# Patient Record
Sex: FEMALE | Race: WHITE | NOT HISPANIC OR LATINO | Employment: FULL TIME | URBAN - METROPOLITAN AREA
[De-identification: names, ages, dates, MRNs, and addresses within clinical notes are randomized per-mention and may not be internally consistent; named-entity substitution may affect disease eponyms.]

---

## 2017-06-20 ENCOUNTER — ALLSCRIPTS OFFICE VISIT (OUTPATIENT)
Dept: OTHER | Facility: OTHER | Age: 32
End: 2017-06-20

## 2017-06-26 LAB — PAP (HISTORICAL): NORMAL

## 2018-01-13 VITALS
HEIGHT: 64 IN | DIASTOLIC BLOOD PRESSURE: 82 MMHG | BODY MASS INDEX: 26.29 KG/M2 | SYSTOLIC BLOOD PRESSURE: 120 MMHG | WEIGHT: 154 LBS

## 2018-04-10 ENCOUNTER — TELEPHONE (OUTPATIENT)
Dept: OBGYN CLINIC | Facility: CLINIC | Age: 33
End: 2018-04-10

## 2018-04-10 DIAGNOSIS — Z30.41 ENCOUNTER FOR SURVEILLANCE OF CONTRACEPTIVE PILLS: Primary | ICD-10-CM

## 2018-04-10 RX ORDER — NORETHINDRONE ACETATE AND ETHINYL ESTRADIOL 1MG-20(21)
1 KIT ORAL DAILY
Qty: 90 TABLET | Refills: 0 | Status: SHIPPED | OUTPATIENT
Start: 2018-04-10 | End: 2018-06-21 | Stop reason: SDUPTHER

## 2018-06-21 ENCOUNTER — ANNUAL EXAM (OUTPATIENT)
Dept: OBGYN CLINIC | Facility: CLINIC | Age: 33
End: 2018-06-21
Payer: COMMERCIAL

## 2018-06-21 VITALS
HEIGHT: 64 IN | BODY MASS INDEX: 26.63 KG/M2 | WEIGHT: 156 LBS | SYSTOLIC BLOOD PRESSURE: 124 MMHG | DIASTOLIC BLOOD PRESSURE: 88 MMHG

## 2018-06-21 DIAGNOSIS — Z30.41 ENCOUNTER FOR SURVEILLANCE OF CONTRACEPTIVE PILLS: ICD-10-CM

## 2018-06-21 DIAGNOSIS — Z01.419 GYNECOLOGIC EXAM NORMAL: Primary | ICD-10-CM

## 2018-06-21 PROCEDURE — S0612 ANNUAL GYNECOLOGICAL EXAMINA: HCPCS | Performed by: PHYSICIAN ASSISTANT

## 2018-06-21 RX ORDER — NORETHINDRONE ACETATE AND ETHINYL ESTRADIOL 1MG-20(21)
1 KIT ORAL DAILY
Qty: 90 TABLET | Refills: 3 | Status: SHIPPED | OUTPATIENT
Start: 2018-06-21 | End: 2019-06-08 | Stop reason: SDUPTHER

## 2018-06-21 NOTE — PROGRESS NOTES
Assessment/Plan   Problem List Items Addressed This Visit     Gynecologic exam normal - Primary     Pap with reflex done today  Will plan to continue Microgestin   Return to office for annual or as needed  Relevant Orders    GP PAP (RFLX HPV PLUS WHEN ASCUS)      Other Visit Diagnoses     Encounter for surveillance of contraceptive pills        Relevant Medications    norethindrone-ethinyl estradiol (JUNEL FE 1/20) 1-20 MG-MCG per tablet          Subjective:     Patient ID: Nola Sims is a 34 y o  y o  female  HPI  33 yo seen for annual exam  Patient currently on Microgestin OCP tolerating well would like to continue  Denies bowel or bladder issues  Last pap: 2017 NILM, 6/15/2016 NILM (-)HRHPV  2015 (+)HRHPV non 16, 18  The following portions of the patient's history were reviewed and updated as appropriate:   She  has a past medical history of HPV (human papilloma virus) infection  She   Patient Active Problem List    Diagnosis Date Noted    Gynecologic exam normal 2018    Bacterial vaginosis 2016     She  has a past surgical history that includes Cyst Removal ()  Her family history includes No Known Problems in her father and mother  She  reports that she has been smoking Cigarettes  She has been smoking about 0 25 packs per day  She has never used smokeless tobacco  She reports that she drinks alcohol  She reports that she does not use drugs  Current Outpatient Prescriptions   Medication Sig Dispense Refill    norethindrone-ethinyl estradiol (JUNEL FE 1/20) 1-20 MG-MCG per tablet Take 1 tablet by mouth daily for 90 days 90 tablet 3     No current facility-administered medications for this visit  She is allergic to metronidazole       Menstrual History:  OB History      Para Term  AB Living    0 0 0 0 0 0    SAB TAB Ectopic Multiple Live Births    0 0 0 0 0         Menarche age: 14  No LMP recorded    Period Cycle (Days): 28  Period Duration (Days): 5  Period Pattern: Regular  Dysmenorrhea: None      Review of Systems   Constitutional: Negative for fatigue, fever and unexpected weight change  HENT: Negative for dental problem and sinus pressure  Eyes: Negative for visual disturbance  Respiratory: Negative for cough, shortness of breath and wheezing  Cardiovascular: Negative for chest pain  Gastrointestinal: Negative for abdominal pain, blood in stool, constipation, diarrhea, nausea and vomiting  Endocrine: Negative for polydipsia  Genitourinary: Negative for difficulty urinating, dyspareunia, dysuria, frequency, hematuria, pelvic pain and urgency  Musculoskeletal: Negative for arthralgias and back pain  Neurological: Negative for dizziness, seizures, light-headedness and headaches  Psychiatric/Behavioral: Negative for suicidal ideas  The patient is not nervous/anxious  Objective:  Vitals:    06/21/18 1516   BP: 124/88   BP Location: Left arm   Patient Position: Sitting   Cuff Size: Standard   Weight: 70 8 kg (156 lb)   Height: 5' 4" (1 626 m)      Physical Exam   Constitutional: She is oriented to person, place, and time  She appears well-developed and well-nourished  Genitourinary: Vagina normal and uterus normal  There is no rash, tenderness, lesion, injury or Bartholin's cyst on the right labia  There is no rash, tenderness, lesion, injury or Bartholin's cyst on the left labia  Vagina exhibits no lesion  No erythema, tenderness or bleeding in the vagina  No signs of injury around the vagina  No vaginal discharge found  Right adnexum does not display mass, does not display tenderness and does not display fullness  Left adnexum does not display mass, does not display tenderness and does not display fullness  Cervix does not exhibit motion tenderness, lesion or discharge  Uterus is not enlarged, tender, exhibiting a mass, irregular (is regular) or mobile  HENT:   Head: Normocephalic and atraumatic     Neck: No thyromegaly present  Cardiovascular: Normal rate, regular rhythm and normal heart sounds  Exam reveals no gallop and no friction rub  No murmur heard  Pulmonary/Chest: Effort normal and breath sounds normal  No respiratory distress  She has no wheezes  Right breast exhibits no inverted nipple, no mass, no nipple discharge, no skin change and no tenderness  Left breast exhibits no inverted nipple, no mass, no nipple discharge, no skin change and no tenderness  Breasts are symmetrical  There is no breast swelling  Abdominal: Soft  She exhibits no distension and no mass  There is no tenderness  There is no rebound and no guarding  No hernia  Lymphadenopathy:     She has no cervical adenopathy  Right: No inguinal adenopathy present  Left: No inguinal adenopathy present  Neurological: She is alert and oriented to person, place, and time  Skin: Skin is warm and dry  Psychiatric: She has a normal mood and affect   Her behavior is normal

## 2018-06-21 NOTE — ASSESSMENT & PLAN NOTE
Pap with reflex done today  Will plan to continue Microgestin 1/20  Return to office for annual or as needed

## 2018-06-26 LAB — THIN PREP CVX: NORMAL

## 2018-06-27 ENCOUNTER — TELEPHONE (OUTPATIENT)
Dept: OBGYN CLINIC | Facility: CLINIC | Age: 33
End: 2018-06-27

## 2019-03-19 DIAGNOSIS — Z30.41 ENCOUNTER FOR SURVEILLANCE OF CONTRACEPTIVE PILLS: ICD-10-CM

## 2019-03-19 RX ORDER — NORETHINDRONE ACETATE AND ETHINYL ESTRADIOL 1MG-20(21)
1 KIT ORAL DAILY
Qty: 90 TABLET | Refills: 1 | Status: CANCELLED | OUTPATIENT
Start: 2019-03-19 | End: 2019-06-17

## 2019-05-29 ENCOUNTER — TELEPHONE (OUTPATIENT)
Dept: OBGYN CLINIC | Facility: CLINIC | Age: 34
End: 2019-05-29

## 2019-06-08 DIAGNOSIS — Z30.41 ENCOUNTER FOR SURVEILLANCE OF CONTRACEPTIVE PILLS: ICD-10-CM

## 2019-06-10 ENCOUNTER — TELEPHONE (OUTPATIENT)
Dept: OBGYN CLINIC | Facility: CLINIC | Age: 34
End: 2019-06-10

## 2019-06-10 RX ORDER — NORETHINDRONE ACETATE AND ETHINYL ESTRADIOL AND FERROUS FUMARATE 1MG-20(21)
KIT ORAL
Qty: 84 TABLET | Refills: 0 | Status: SHIPPED | OUTPATIENT
Start: 2019-06-10 | End: 2019-06-26 | Stop reason: SDUPTHER

## 2019-06-26 ENCOUNTER — ANNUAL EXAM (OUTPATIENT)
Dept: OBGYN CLINIC | Facility: CLINIC | Age: 34
End: 2019-06-26
Payer: COMMERCIAL

## 2019-06-26 VITALS
HEIGHT: 64 IN | SYSTOLIC BLOOD PRESSURE: 116 MMHG | BODY MASS INDEX: 26.46 KG/M2 | WEIGHT: 155 LBS | DIASTOLIC BLOOD PRESSURE: 80 MMHG

## 2019-06-26 DIAGNOSIS — Z30.41 ENCOUNTER FOR SURVEILLANCE OF CONTRACEPTIVE PILLS: ICD-10-CM

## 2019-06-26 DIAGNOSIS — Z01.419 ROUTINE GYNECOLOGICAL EXAMINATION: Primary | ICD-10-CM

## 2019-06-26 PROCEDURE — S0612 ANNUAL GYNECOLOGICAL EXAMINA: HCPCS | Performed by: PHYSICIAN ASSISTANT

## 2019-06-26 RX ORDER — NORETHINDRONE ACETATE AND ETHINYL ESTRADIOL 1MG-20(21)
1 KIT ORAL DAILY
Qty: 84 TABLET | Refills: 0 | Status: SHIPPED | OUTPATIENT
Start: 2019-06-26 | End: 2019-06-26 | Stop reason: SDUPTHER

## 2019-06-26 RX ORDER — NORETHINDRONE ACETATE AND ETHINYL ESTRADIOL 1MG-20(21)
1 KIT ORAL DAILY
Qty: 84 TABLET | Refills: 3 | Status: SHIPPED | OUTPATIENT
Start: 2019-06-26 | End: 2021-02-03

## 2019-07-04 LAB — THIN PREP CVX: NORMAL

## 2019-11-19 DIAGNOSIS — Z30.41 ENCOUNTER FOR SURVEILLANCE OF CONTRACEPTIVE PILLS: ICD-10-CM

## 2019-11-19 RX ORDER — NORETHINDRONE ACETATE AND ETHINYL ESTRADIOL AND FERROUS FUMARATE 1MG-20(21)
KIT ORAL
Qty: 84 TABLET | Refills: 3 | Status: SHIPPED | OUTPATIENT
Start: 2019-11-19 | End: 2020-10-21

## 2020-10-17 DIAGNOSIS — Z30.41 ENCOUNTER FOR SURVEILLANCE OF CONTRACEPTIVE PILLS: ICD-10-CM

## 2020-10-21 RX ORDER — NORETHINDRONE ACETATE AND ETHINYL ESTRADIOL AND FERROUS FUMARATE 1MG-20(21)
KIT ORAL
Qty: 84 TABLET | Refills: 1 | Status: SHIPPED | OUTPATIENT
Start: 2020-10-21 | End: 2021-02-03

## 2021-02-03 ENCOUNTER — ANNUAL EXAM (OUTPATIENT)
Dept: OBGYN CLINIC | Facility: CLINIC | Age: 36
End: 2021-02-03
Payer: COMMERCIAL

## 2021-02-03 VITALS
SYSTOLIC BLOOD PRESSURE: 122 MMHG | BODY MASS INDEX: 31.24 KG/M2 | DIASTOLIC BLOOD PRESSURE: 78 MMHG | WEIGHT: 183 LBS | HEIGHT: 64 IN

## 2021-02-03 DIAGNOSIS — Z01.419 GYNECOLOGIC EXAM NORMAL: Primary | ICD-10-CM

## 2021-02-03 DIAGNOSIS — Z30.41 ENCOUNTER FOR SURVEILLANCE OF CONTRACEPTIVE PILLS: ICD-10-CM

## 2021-02-03 PROCEDURE — G0145 SCR C/V CYTO,THINLAYER,RESCR: HCPCS | Performed by: PHYSICIAN ASSISTANT

## 2021-02-03 PROCEDURE — 87624 HPV HI-RISK TYP POOLED RSLT: CPT | Performed by: PHYSICIAN ASSISTANT

## 2021-02-03 PROCEDURE — S0612 ANNUAL GYNECOLOGICAL EXAMINA: HCPCS | Performed by: PHYSICIAN ASSISTANT

## 2021-02-03 RX ORDER — ACETAMINOPHEN AND CODEINE PHOSPHATE 120; 12 MG/5ML; MG/5ML
1 SOLUTION ORAL DAILY
Qty: 90 TABLET | Refills: 3 | Status: SHIPPED | OUTPATIENT
Start: 2021-02-03 | End: 2022-01-05

## 2021-02-03 NOTE — ASSESSMENT & PLAN NOTE
Pap guidelines reviewed  Pap with HPV done today  Reviewed contraindication of combined OCP if smoking over the age of 28  Reviewed progesterone only options including progesterone only pill, Nexplanon, Depo Provera, IUD  Patient would like to try progesterone only pill  Reviewed when to start, what to do if misses pill  Recommend using condoms for the 1st month on the pill, if misses more than 2 pills in the pack, if on antibiotics and for STD prevention  Reviewed common side effects of the pill including nausea, vomiting, breast pain, bloating, fatigue, mood swings, weight gain, and increased acne  Reassured side effects typically diminish in the first month or two on the pill     Return to office for annual exam

## 2021-02-03 NOTE — PROGRESS NOTES
Assessment/Plan   Problem List Items Addressed This Visit        Other    Gynecologic exam normal - Primary     Pap guidelines reviewed  Pap with HPV done today  Reviewed contraindication of combined OCP if smoking over the age of 28  Reviewed progesterone only options including progesterone only pill, Nexplanon, Depo Provera, IUD  Patient would like to try progesterone only pill  Reviewed when to start, what to do if misses pill  Recommend using condoms for the 1st month on the pill, if misses more than 2 pills in the pack, if on antibiotics and for STD prevention  Reviewed common side effects of the pill including nausea, vomiting, breast pain, bloating, fatigue, mood swings, weight gain, and increased acne  Reassured side effects typically diminish in the first month or two on the pill  Return to office for annual exam           Relevant Orders    Liquid-based pap, screening      Other Visit Diagnoses     Encounter for surveillance of contraceptive pills        Relevant Medications    norethindrone (MICRONOR) 0 35 MG tablet          Subjective:     Patient ID: Jessie Forbes is a 38 y o  y o  female  HPI  39 yo seen for annual exam  Currently on Junel Fe 1/20  Tolerating well, menses regular with no issues  Does reports smoking socially about a pack over a two weeks  Denies bowel or bladder issues  Last pap: 6/26/2019 NILM, 6/5/2016 NILM (-)HRHPV  Hx of HPV in 2015, normal since  The following portions of the patient's history were reviewed and updated as appropriate:   She  has a past medical history of HPV (human papilloma virus) infection  She   Patient Active Problem List    Diagnosis Date Noted    Routine gynecological examination 06/26/2019    Gynecologic exam normal 06/21/2018    Bacterial vaginosis 04/27/2016     She  has a past surgical history that includes Cyst Removal (2011)  Her family history includes No Known Problems in her father and mother    She  reports that she has been smoking cigarettes  She has been smoking about 0 25 packs per day  She has never used smokeless tobacco  She reports current alcohol use  She reports that she does not use drugs  Current Outpatient Medications   Medication Sig Dispense Refill    norethindrone (MICRONOR) 0 35 MG tablet Take 1 tablet (0 35 mg total) by mouth daily 90 tablet 3     No current facility-administered medications for this visit  She is allergic to metronidazole       Menstrual History:  OB History        0    Para   0    Term   0       0    AB   0    Living   0       SAB   0    TAB   0    Ectopic   0    Multiple   0    Live Births   0               Patient's last menstrual period was 2021 (approximate)  Review of Systems   Constitutional: Negative for fatigue, fever and unexpected weight change  HENT: Negative for dental problem and sinus pressure  Eyes: Negative for visual disturbance  Respiratory: Negative for cough, shortness of breath and wheezing  Cardiovascular: Negative for chest pain  Gastrointestinal: Negative for abdominal pain, blood in stool, constipation, diarrhea, nausea and vomiting  Endocrine: Negative for polydipsia  Genitourinary: Negative for difficulty urinating, dyspareunia, dysuria, frequency, hematuria, pelvic pain and urgency  Musculoskeletal: Negative for arthralgias and back pain  Neurological: Negative for dizziness, seizures, light-headedness and headaches  Psychiatric/Behavioral: Negative for suicidal ideas  The patient is not nervous/anxious  Objective:  Vitals:    21 1327   BP: 122/78   BP Location: Left arm   Patient Position: Sitting   Cuff Size: Large   Weight: 83 kg (183 lb)   Height: 5' 4" (1 626 m)      Physical Exam  Constitutional:       Appearance: Normal appearance  She is well-developed     Genitourinary:      Vulva, urethra, bladder, vagina and uterus normal       No vulval condylomata, lesion, tenderness, ulcerations, Bartholin's cyst or rash noted  No signs of labial injury  No labial fusion  No inguinal adenopathy present in the right or left side  No urethral prolapse, pain, swelling, tenderness, caruncle, mass or diverticulum present  Bladder is not distended or tender  No signs of injury or lesions in the vagina  No vaginal discharge, erythema, tenderness or bleeding  No cervical motion tenderness, discharge or lesion  Uterus is not enlarged, tender, irregular or mobile  No uterine mass detected  No right or left adnexal mass present  Right adnexa not tender or full  Left adnexa not tender or full  HENT:      Head: Normocephalic and atraumatic  Neck:      Thyroid: No thyromegaly  Cardiovascular:      Rate and Rhythm: Normal rate and regular rhythm  Heart sounds: Normal heart sounds  No murmur  No friction rub  No gallop  Pulmonary:      Effort: Pulmonary effort is normal  No respiratory distress  Breath sounds: Normal breath sounds  No wheezing  Chest:      Breasts: Breasts are symmetrical          Right: Normal  No swelling, bleeding, inverted nipple, mass, nipple discharge, skin change or tenderness  Left: Normal  No swelling, bleeding, inverted nipple, mass, nipple discharge, skin change or tenderness  Abdominal:      General: There is no distension  Palpations: Abdomen is soft  There is no mass  Tenderness: There is no abdominal tenderness  There is no guarding or rebound  Hernia: No hernia is present  Lymphadenopathy:      Cervical: No cervical adenopathy  Upper Body:      Right upper body: No supraclavicular, axillary or pectoral adenopathy  Left upper body: No supraclavicular, axillary or pectoral adenopathy  Lower Body: No right inguinal adenopathy  No left inguinal adenopathy  Neurological:      Mental Status: She is alert and oriented to person, place, and time  Skin:     General: Skin is warm and dry  Psychiatric:         Behavior: Behavior normal

## 2021-02-05 LAB
HPV HR 12 DNA CVX QL NAA+PROBE: NEGATIVE
HPV16 DNA CVX QL NAA+PROBE: NEGATIVE
HPV18 DNA CVX QL NAA+PROBE: NEGATIVE

## 2021-02-08 LAB
LAB AP GYN PRIMARY INTERPRETATION: NORMAL
LAB AP LMP: NORMAL
Lab: NORMAL

## 2021-04-08 DIAGNOSIS — Z23 ENCOUNTER FOR IMMUNIZATION: ICD-10-CM

## 2022-01-05 DIAGNOSIS — Z30.41 ENCOUNTER FOR SURVEILLANCE OF CONTRACEPTIVE PILLS: ICD-10-CM

## 2022-01-05 RX ORDER — ACETAMINOPHEN AND CODEINE PHOSPHATE 120; 12 MG/5ML; MG/5ML
SOLUTION ORAL
Qty: 84 TABLET | Refills: 0 | Status: SHIPPED | OUTPATIENT
Start: 2022-01-05 | End: 2022-02-16 | Stop reason: SDUPTHER

## 2022-02-16 ENCOUNTER — OFFICE VISIT (OUTPATIENT)
Dept: OBGYN CLINIC | Facility: CLINIC | Age: 37
End: 2022-02-16
Payer: COMMERCIAL

## 2022-02-16 VITALS
DIASTOLIC BLOOD PRESSURE: 80 MMHG | BODY MASS INDEX: 30.39 KG/M2 | HEIGHT: 64 IN | WEIGHT: 178 LBS | SYSTOLIC BLOOD PRESSURE: 116 MMHG

## 2022-02-16 DIAGNOSIS — Z30.41 ENCOUNTER FOR SURVEILLANCE OF CONTRACEPTIVE PILLS: ICD-10-CM

## 2022-02-16 DIAGNOSIS — Z01.419 GYNECOLOGIC EXAM NORMAL: Primary | ICD-10-CM

## 2022-02-16 PROCEDURE — S0612 ANNUAL GYNECOLOGICAL EXAMINA: HCPCS | Performed by: PHYSICIAN ASSISTANT

## 2022-02-16 RX ORDER — DIPHENOXYLATE HYDROCHLORIDE AND ATROPINE SULFATE 2.5; .025 MG/1; MG/1
1 TABLET ORAL DAILY
COMMUNITY

## 2022-02-16 RX ORDER — ACETAMINOPHEN AND CODEINE PHOSPHATE 120; 12 MG/5ML; MG/5ML
0.35 SOLUTION ORAL DAILY
Qty: 84 TABLET | Refills: 3 | Status: SHIPPED | OUTPATIENT
Start: 2022-02-16

## 2022-02-16 NOTE — PROGRESS NOTES
Assessment/Plan   Problem List Items Addressed This Visit        Other    Gynecologic exam normal - Primary     Pap guidelines reviewed  Pap deferred secondary to negative pap and HPV in 2021 in this low risk patient  Reviewed progesterone only options including Depo Provera shot, Mirena IUD, Nexplanon  Patient would like to continue progesterone only pill at this time  Will call if quits smoking and desires estrogen combined option  Reviewed constipation with patient, with new onset if continues recommend follow up with PCP or GI  Reviewed hydration, fiber, stool softeners, do not sit on toilet for longer than 5 minutes and avoid straining  Return to office for annual or as needed  Other Visit Diagnoses     Encounter for surveillance of contraceptive pills        Relevant Medications    norethindrone (MICRONOR) 0 35 MG tablet          Subjective:     Patient ID: Dave Gamez is a 39 y o  y o  female  HPI  40 yo seen for annual exam  Currently on Micronor OCP, does not remember to take at the same time and has some irregular menses  Missed menses last month  Patient does smoke  Denies bladder issues, reports more constipation recently  No dietary changes or other known causes  Last pap: 2/3/2021 NILM (-)HRHPV  The following portions of the patient's history were reviewed and updated as appropriate:   She  has a past medical history of HPV (human papilloma virus) infection  She   Patient Active Problem List    Diagnosis Date Noted    Routine gynecological examination 06/26/2019    Gynecologic exam normal 06/21/2018    Bacterial vaginosis 04/27/2016     She  has a past surgical history that includes Cyst Removal (2011)  Her family history includes No Known Problems in her father and mother  She  reports that she has been smoking cigarettes  She has been smoking about 0 25 packs per day  She has never used smokeless tobacco  She reports current alcohol use   She reports that she does not use drugs  Current Outpatient Medications   Medication Sig Dispense Refill    multivitamin (THERAGRAN) TABS Take 1 tablet by mouth daily      norethindrone (MICRONOR) 0 35 MG tablet Take 1 tablet (0 35 mg total) by mouth daily 84 tablet 3     No current facility-administered medications for this visit  She is allergic to metronidazole       Menstrual History:  OB History        0    Para   0    Term   0       0    AB   0    Living   0       SAB   0    IAB   0    Ectopic   0    Multiple   0    Live Births   0                  No LMP recorded (lmp unknown)  (Menstrual status: Birth Control)  Review of Systems   Constitutional: Negative for fatigue, fever and unexpected weight change  HENT: Negative for dental problem and sinus pressure  Eyes: Negative for visual disturbance  Respiratory: Negative for cough, shortness of breath and wheezing  Cardiovascular: Negative for chest pain  Gastrointestinal: Negative for abdominal pain, blood in stool, constipation, diarrhea, nausea and vomiting  Endocrine: Negative for polydipsia  Genitourinary: Negative for difficulty urinating, dyspareunia, dysuria, frequency, hematuria, pelvic pain and urgency  Musculoskeletal: Negative for arthralgias and back pain  Neurological: Negative for dizziness, seizures, light-headedness and headaches  Psychiatric/Behavioral: Negative for suicidal ideas  The patient is not nervous/anxious  Objective:  Vitals:    22 1014   BP: 116/80   BP Location: Left arm   Patient Position: Sitting   Cuff Size: Standard   Weight: 80 7 kg (178 lb)   Height: 5' 4" (1 626 m)      Physical Exam  Constitutional:       Appearance: Normal appearance  She is well-developed  Genitourinary:      Vulva and bladder normal       No lesions in the vagina  Right Labia: No rash, tenderness, lesions or skin changes  Left Labia: No tenderness, lesions, skin changes or rash  No labial fusion noted  No inguinal adenopathy present in the right or left side  No vaginal discharge, erythema, tenderness or bleeding  Right Adnexa: not tender, not full and no mass present  Left Adnexa: not tender, not full and no mass present  No cervical motion tenderness, discharge or lesion  Uterus is not enlarged, tender or irregular  No uterine mass detected  No urethral prolapse, tenderness or mass present  Bladder is not tender  Breasts: Breasts are symmetrical       Right: No swelling, bleeding, inverted nipple, mass, nipple discharge, skin change, tenderness, axillary adenopathy or supraclavicular adenopathy  Left: No swelling, bleeding, inverted nipple, mass, nipple discharge, skin change, tenderness, axillary adenopathy or supraclavicular adenopathy  HENT:      Head: Normocephalic and atraumatic  Neck:      Thyroid: No thyromegaly  Cardiovascular:      Rate and Rhythm: Normal rate and regular rhythm  Heart sounds: Normal heart sounds  No murmur heard  No friction rub  No gallop  Pulmonary:      Effort: Pulmonary effort is normal  No respiratory distress  Breath sounds: Normal breath sounds  No wheezing  Abdominal:      General: There is no distension  Palpations: Abdomen is soft  There is no mass  Tenderness: There is no abdominal tenderness  There is no guarding or rebound  Hernia: No hernia is present  Lymphadenopathy:      Cervical: No cervical adenopathy  Upper Body:      Right upper body: No supraclavicular, axillary or pectoral adenopathy  Left upper body: No supraclavicular, axillary or pectoral adenopathy  Lower Body: No right inguinal adenopathy  No left inguinal adenopathy  Neurological:      Mental Status: She is alert and oriented to person, place, and time  Skin:     General: Skin is warm and dry     Psychiatric:         Behavior: Behavior normal

## 2022-02-16 NOTE — ASSESSMENT & PLAN NOTE
Pap guidelines reviewed  Pap deferred secondary to negative pap and HPV in 2021 in this low risk patient  Reviewed progesterone only options including Depo Provera shot, Mirena IUD, Nexplanon  Patient would like to continue progesterone only pill at this time  Will call if quits smoking and desires estrogen combined option  Reviewed constipation with patient, with new onset if continues recommend follow up with PCP or GI  Reviewed hydration, fiber, stool softeners, do not sit on toilet for longer than 5 minutes and avoid straining  Return to office for annual or as needed

## 2023-01-25 DIAGNOSIS — Z30.41 ENCOUNTER FOR SURVEILLANCE OF CONTRACEPTIVE PILLS: ICD-10-CM

## 2023-01-25 RX ORDER — ACETAMINOPHEN AND CODEINE PHOSPHATE 120; 12 MG/5ML; MG/5ML
0.35 SOLUTION ORAL DAILY
Qty: 84 TABLET | Refills: 3 | Status: SHIPPED | OUTPATIENT
Start: 2023-01-25

## 2023-04-25 NOTE — PROGRESS NOTES
Assessment/Plan   Problem List Items Addressed This Visit    None  Visit Diagnoses     Well woman exam    -  Primary    Relevant Orders    Liquid-based pap, screening    Encounter for surveillance of contraceptive pills        Relevant Medications    norethindrone (MICRONOR) 0 35 MG tablet    Folliculitis of perineum        Relevant Orders    HSV TYPE 1,2 DNA PCR          Discussion    All questions have been answered to her satisfaction  RTO for APE or sooner if needed  Pap done per pt request    Viral culture done on perineal lesion  If culture negative will plan to proceed with derm follow up as she had previously planned  Subjective     HPI   Jodi Morrison is a 44 y o  female who presents for annual well woman exam    LMP -over a month ; She is not concerned for pregnancy at this point  Periods typically come monthly, they are typically lighter and short on her POP  She liked the combo pills better but pt is a smoker over age 27    Pt does note that there is one spot that she appears to get on her perineum near her buttocks where she has been getting a recurrent inflamed area  It will start red then become more of a pocket of fluid that will then burst and drain and resolve  It is painful when it comes  She was given abx by PCP but that did not improve the issue  It comes and goes in the same area since it began last year  It will be painful  She has been monogamous x 4 years  (+) SBEs - no breast masses, asymmetry, nipple discharge or bleeding, changes in skin of breast, or breast tenderness bilaterally    No abd/pelvic pain or HAs; No menopausal symptoms: No hot flashes/night sweats, problems with intercourse, vaginal dryness; sleeping well;     Pt is sexually active in a mutually monog/ sexual relationship x 4 years; No issues with intercourse; She declines sti/hiv/hep testing; Feels safe at home    Current contraception: POP     (+) PCP for routine Bw/care;     Last Pap - 2/3/21 WNL neg HPV   History of abnormal Pap smear:  + HPV WNL since     Review of Systems   Constitutional: Negative  Respiratory: Negative  Gastrointestinal: Negative  Endocrine: Negative  Genitourinary: Negative  The following portions of the patient's history were reviewed and updated as appropriate: allergies, current medications, past family history, past medical history, past social history, past surgical history and problem list          OB History        0    Para   0    Term   0       0    AB   0    Living   0       SAB   0    IAB   0    Ectopic   0    Multiple   0    Live Births   0                 Past Medical History:   Diagnosis Date   • HPV (human papilloma virus) infection     (+)non 12, 25 in  negative        Past Surgical History:   Procedure Laterality Date   • CYST REMOVAL      cyst in groin       Family History   Problem Relation Age of Onset   • No Known Problems Mother    • No Known Problems Father        Social History     Socioeconomic History   • Marital status: Single     Spouse name: Not on file   • Number of children: Not on file   • Years of education: Not on file   • Highest education level: Not on file   Occupational History   • Not on file   Tobacco Use   • Smoking status: Some Days     Packs/day: 0 25     Years: 15 00     Pack years: 3 75     Types: Cigarettes   • Smokeless tobacco: Never   • Tobacco comments:     socially 1 pack every 2 weeks      Vaping Use   • Vaping Use: Never used   Substance and Sexual Activity   • Alcohol use: Yes     Comment: 2 drinks/wk   • Drug use: No   • Sexual activity: Yes     Partners: Male     Birth control/protection: OCP   Other Topics Concern   • Not on file   Social History Narrative   • Not on file     Social Determinants of Health     Financial Resource Strain: Not on file   Food Insecurity: Not on file   Transportation Needs: Not on file   Physical Activity: Not on file   Stress: Not on file   Social "Connections: Not on file   Intimate Partner Violence: Not on file   Housing Stability: Not on file         Current Outpatient Medications:   •  multivitamin (THERAGRAN) TABS, Take 1 tablet by mouth daily, Disp: , Rfl:   •  norethindrone (MICRONOR) 0 35 MG tablet, Take 1 tablet (0 35 mg total) by mouth daily, Disp: 84 tablet, Rfl: 4    Allergies   Allergen Reactions   • Metronidazole Hives and Rash       Objective   Vitals:    04/26/23 1125   BP: 112/74   BP Location: Left arm   Patient Position: Sitting   Cuff Size: Large   Weight: 84 8 kg (187 lb)   Height: 5' 4\" (1 626 m)     Physical Exam  Vitals reviewed  HENT:      Head: Normocephalic and atraumatic  Cardiovascular:      Rate and Rhythm: Normal rate and regular rhythm  Pulmonary:      Effort: Pulmonary effort is normal       Breath sounds: Normal breath sounds  Chest:   Breasts:     Breasts are symmetrical       Right: No swelling, bleeding, inverted nipple, mass, nipple discharge, skin change or tenderness  Left: No swelling, bleeding, inverted nipple, mass, nipple discharge, skin change or tenderness  Abdominal:      General: Abdomen is flat  Bowel sounds are normal       Palpations: Abdomen is soft  Tenderness: There is no abdominal tenderness  There is no right CVA tenderness, left CVA tenderness or guarding  Genitourinary:     General: Normal vulva  Pubic Area: No rash  Labia:         Right: No rash, tenderness, lesion or injury  Left: No rash, tenderness, lesion or injury  Urethra: No prolapse, urethral pain, urethral swelling or urethral lesion  Vagina: Normal  No signs of injury and foreign body  No vaginal discharge or erythema  Cervix: Normal       Uterus: Normal        Adnexa: Right adnexa normal and left adnexa normal        Musculoskeletal:      Cervical back: Neck supple  Lymphadenopathy:      Upper Body:      Right upper body: No axillary adenopathy        Left upper body: No axillary " adenopathy  Skin:     General: Skin is warm and dry  Neurological:      Mental Status: She is alert and oriented to person, place, and time  Psychiatric:         Mood and Affect: Mood normal          Behavior: Behavior normal          Thought Content: Thought content normal          Judgment: Judgment normal          There are no Patient Instructions on file for this visit

## 2023-04-26 ENCOUNTER — ANNUAL EXAM (OUTPATIENT)
Dept: OBGYN CLINIC | Facility: CLINIC | Age: 38
End: 2023-04-26

## 2023-04-26 VITALS
DIASTOLIC BLOOD PRESSURE: 74 MMHG | BODY MASS INDEX: 31.92 KG/M2 | SYSTOLIC BLOOD PRESSURE: 112 MMHG | HEIGHT: 64 IN | WEIGHT: 187 LBS

## 2023-04-26 DIAGNOSIS — Z30.41 ENCOUNTER FOR SURVEILLANCE OF CONTRACEPTIVE PILLS: ICD-10-CM

## 2023-04-26 DIAGNOSIS — Z01.419 WELL WOMAN EXAM: Primary | ICD-10-CM

## 2023-04-26 DIAGNOSIS — L73.9 FOLLICULITIS OF PERINEUM: ICD-10-CM

## 2023-04-26 PROBLEM — Q23.1 BICUSPID AORTIC VALVE: Status: ACTIVE | Noted: 2023-01-25

## 2023-04-26 PROBLEM — Q23.81 BICUSPID AORTIC VALVE: Status: ACTIVE | Noted: 2023-01-25

## 2023-04-26 RX ORDER — ACETAMINOPHEN AND CODEINE PHOSPHATE 120; 12 MG/5ML; MG/5ML
0.35 SOLUTION ORAL DAILY
Qty: 84 TABLET | Refills: 4 | Status: SHIPPED | OUTPATIENT
Start: 2023-04-26

## 2023-04-30 LAB
HSV1 DNA SPEC QL NAA+PROBE: NEGATIVE
HSV2 DNA SPEC QL NAA+PROBE: NEGATIVE

## 2023-05-08 LAB
LAB AP GYN PRIMARY INTERPRETATION: NORMAL
Lab: NORMAL

## 2024-01-04 ENCOUNTER — TELEPHONE (OUTPATIENT)
Dept: OBGYN CLINIC | Facility: CLINIC | Age: 39
End: 2024-01-04

## 2024-01-04 NOTE — PROGRESS NOTES
Assessment/Plan:     Problem List Items Addressed This Visit    None  Visit Diagnoses       Breast pain    -  Primary            Reviewed unremarkable physical exam findings with patient.  Discussed the possibility of changes with bras to help with the tenderness. Reviewed that strong, persistent cough can produce rib/musculosketal pain but unlikely in the breast.  Offered breast imaging (dx mammogram/US of L breast). She declined at this time and will continue to monitor.  Reviewed if pain continues or worsens, to please call for imaging orders.    Subjective:      Patient ID: Maya Ruiz is a 38 y.o. female.    Patient presents to the office with breast concerns. She reports the discomfort is primarily on the left side. She reports it is an odd sensation with feeling like she needs to take her bra off or tender to breast. Discomfort x 2 weeks. She denies cyclical pattern. Denies nipple discharge, nipple changes, skin changes, rashes. She denies weight fluctuations. She wears sports bras and non-underwire bras. She reports the bras leave minimal impression. She has been sick recently with coughing.     Unsure of breast cancer history; possible paternal grandmother.         The following portions of the patient's history were reviewed and updated as appropriate: She  has a past medical history of HPV (human papilloma virus) infection.  She   Patient Active Problem List    Diagnosis Date Noted    Bicuspid aortic valve 01/25/2023    Routine gynecological examination 06/26/2019    Gynecologic exam normal 06/21/2018    Bacterial vaginosis 04/27/2016     She  has a past surgical history that includes Cyst Removal (2011).  Her family history includes No Known Problems in her father and mother.  She  reports that she has been smoking cigarettes. She has a 3.8 pack-year smoking history. She has never used smokeless tobacco. She reports current alcohol use. She reports that she does not use drugs.  Current Outpatient  "Medications   Medication Sig Dispense Refill    benzonatate (TESSALON PERLES) 100 mg capsule Take 100 mg by mouth every 8 (eight) hours as needed      multivitamin (THERAGRAN) TABS Take 1 tablet by mouth daily      norethindrone (MICRONOR) 0.35 MG tablet Take 1 tablet (0.35 mg total) by mouth daily 84 tablet 4    traZODone (DESYREL) 100 mg tablet Take 200 mg by mouth       No current facility-administered medications for this visit.     Current Outpatient Medications on File Prior to Visit   Medication Sig    benzonatate (TESSALON PERLES) 100 mg capsule Take 100 mg by mouth every 8 (eight) hours as needed    multivitamin (THERAGRAN) TABS Take 1 tablet by mouth daily    norethindrone (MICRONOR) 0.35 MG tablet Take 1 tablet (0.35 mg total) by mouth daily    traZODone (DESYREL) 100 mg tablet Take 200 mg by mouth     No current facility-administered medications on file prior to visit.     She is allergic to metronidazole..    Review of Systems      Objective:      /86 (BP Location: Left arm, Patient Position: Sitting, Cuff Size: Standard)   Ht 5' 4\" (1.626 m)   Wt 86.1 kg (189 lb 12.8 oz)   LMP 01/03/2024 (Exact Date)   BMI 32.58 kg/m²          Physical Exam  Vitals and nursing note reviewed.   Constitutional:       General: She is not in acute distress.     Appearance: Normal appearance.   HENT:      Head: Normocephalic.   Eyes:      Conjunctiva/sclera: Conjunctivae normal.   Pulmonary:      Effort: Pulmonary effort is normal.   Chest:   Breasts:     Breasts are symmetrical.      Right: Normal.      Left: Tenderness present. No mass.       Abdominal:      General: Abdomen is flat.      Palpations: Abdomen is soft.   Musculoskeletal:         General: Normal range of motion.      Cervical back: Normal range of motion.   Skin:     General: Skin is warm and dry.   Neurological:      Mental Status: She is alert and oriented to person, place, and time.   Psychiatric:         Mood and Affect: Mood normal.         " Behavior: Behavior normal.         Thought Content: Thought content normal.         Judgment: Judgment normal.

## 2024-01-04 NOTE — TELEPHONE ENCOUNTER
"Patient called, is having discomfort in left breast, \"feels like it got bumped\". Has an annual in April but would like to be seen sooner for discomfort. Prefers to be contacted through [facility].   "

## 2024-01-05 ENCOUNTER — OFFICE VISIT (OUTPATIENT)
Dept: OBGYN CLINIC | Facility: CLINIC | Age: 39
End: 2024-01-05
Payer: COMMERCIAL

## 2024-01-05 VITALS
SYSTOLIC BLOOD PRESSURE: 128 MMHG | BODY MASS INDEX: 32.4 KG/M2 | WEIGHT: 189.8 LBS | HEIGHT: 64 IN | DIASTOLIC BLOOD PRESSURE: 86 MMHG

## 2024-01-05 DIAGNOSIS — N64.4 BREAST PAIN: Primary | ICD-10-CM

## 2024-01-05 PROCEDURE — 99213 OFFICE O/P EST LOW 20 MIN: CPT

## 2024-01-05 RX ORDER — TRAZODONE HYDROCHLORIDE 100 MG/1
200 TABLET ORAL
COMMUNITY
Start: 2024-01-02

## 2024-01-05 RX ORDER — BENZONATATE 100 MG/1
100 CAPSULE ORAL EVERY 8 HOURS PRN
COMMUNITY
Start: 2024-01-02 | End: 2024-01-12

## 2024-02-21 PROBLEM — Z01.419 ROUTINE GYNECOLOGICAL EXAMINATION: Status: RESOLVED | Noted: 2019-06-26 | Resolved: 2024-02-21

## 2024-02-21 PROBLEM — Z01.419 GYNECOLOGIC EXAM NORMAL: Status: RESOLVED | Noted: 2018-06-21 | Resolved: 2024-02-21

## 2024-03-15 ENCOUNTER — OFFICE VISIT (OUTPATIENT)
Dept: FAMILY MEDICINE CLINIC | Facility: CLINIC | Age: 39
End: 2024-03-15
Payer: COMMERCIAL

## 2024-03-15 VITALS
RESPIRATION RATE: 16 BRPM | OXYGEN SATURATION: 98 % | HEIGHT: 64 IN | BODY MASS INDEX: 31.58 KG/M2 | SYSTOLIC BLOOD PRESSURE: 122 MMHG | WEIGHT: 185 LBS | TEMPERATURE: 98.6 F | HEART RATE: 73 BPM | DIASTOLIC BLOOD PRESSURE: 78 MMHG

## 2024-03-15 DIAGNOSIS — L72.9 INFECTED CYST OF SKIN: ICD-10-CM

## 2024-03-15 DIAGNOSIS — L08.9 INFECTED CYST OF SKIN: ICD-10-CM

## 2024-03-15 DIAGNOSIS — G47.09 OTHER INSOMNIA: ICD-10-CM

## 2024-03-15 DIAGNOSIS — R25.1 TREMORS OF NERVOUS SYSTEM: ICD-10-CM

## 2024-03-15 DIAGNOSIS — Q23.1 BICUSPID AORTIC VALVE: Primary | ICD-10-CM

## 2024-03-15 DIAGNOSIS — G25.0 BENIGN HEAD TREMOR: ICD-10-CM

## 2024-03-15 PROCEDURE — 99204 OFFICE O/P NEW MOD 45 MIN: CPT | Performed by: FAMILY MEDICINE

## 2024-03-15 RX ORDER — TRAZODONE HYDROCHLORIDE 100 MG/1
200 TABLET ORAL
Qty: 60 TABLET | Refills: 1 | Status: SHIPPED | OUTPATIENT
Start: 2024-03-15

## 2024-03-15 RX ORDER — CLINDAMYCIN PHOSPHATE 10 UG/ML
LOTION TOPICAL
COMMUNITY
Start: 2024-02-20

## 2024-03-15 RX ORDER — PROPRANOLOL HYDROCHLORIDE 10 MG/1
10 TABLET ORAL 2 TIMES DAILY
Qty: 10 TABLET | Refills: 0 | Status: SHIPPED | OUTPATIENT
Start: 2024-03-15 | End: 2024-03-18 | Stop reason: SDUPTHER

## 2024-03-15 NOTE — PROGRESS NOTES
Name: Maya Ruiz      : 1985      MRN: [id number]  Encounter Provider: Renita Soares MD  Encounter Date: 3/15/2024   Encounter department: [facility]    Assessment & Plan     Problem List Items Addressed This Visit       Bicuspid aortic valve - Primary     Discovered on an ECHO 2 years ago  No cardiac symptoms           Relevant Medications    propranolol (INDERAL) 10 mg tablet    Other Relevant Orders    Echo complete w/ contrast if indicated    Other insomnia     Improved after increasing the dose from 100--> 200 mg daily           Relevant Medications    traZODone (DESYREL) 100 mg tablet    Infected cyst of skin     Chronic and located on the buttocks   Dermatology is following and is using clindamycin lotion  Not resolving  Small area of fluctuance. Offered I&D but will likely re-accumulate if cyst wall is not removed  F/u jaimie next month       Relevant Medications    clindamycin (CLEOCIN T) 1 % lotion     Other Visit Diagnoses       Tremors of nervous system        Relevant Medications    propranolol (INDERAL) 10 mg tablet    Benign head tremor        Noted head bobbing. States its rhythmic. No tremors. Feels restless and denies overt signs of anxiety. Possibly de musset sign from AI vs nervous tremor. Will trial BB and get repeat ECHO to look for valvular disease. Last one in  showed mild aortic regurgitation.     Relevant Orders    Echo complete w/ contrast if indicated                     Subjective      Here as a new patient. Last PCP was Dr. Urias at [facility]. Reports head bobbing for the past 2 months. Also feels like her body is shaking. Worse at night when she lays to rest and is not distracted. No head trauma, palpitations, chest pain, light headedness, or blurry vision. Trazodone was increased to 200 mg after tremor started. Bicuspid aortic valve appreciated on ECHO in  with mild AI. PCP tried paroxetine but stopped after 3 days due to insomnia. Also did a MRI  "of the brain IAC which showed few subcentimeter foci of supratentorial white matter signal abnormality are non-specific and could reflect a normal variant.        Review of Systems   Respiratory:  Negative for shortness of breath.    Cardiovascular:  Negative for chest pain, palpitations and leg swelling.   Neurological:  Positive for tremors. Negative for dizziness, weakness, numbness and headaches.        Head bobbing    Psychiatric/Behavioral:  Positive for sleep disturbance (better with trazodone 200). The patient is nervous/anxious (Nervous person).        Current Outpatient Medications on File Prior to Visit   Medication Sig   • clindamycin (CLEOCIN T) 1 % lotion APPLY TOPICALLY TO THE AFFECTED AREA DAILY FOR BOILS   • multivitamin (THERAGRAN) TABS Take 1 tablet by mouth daily   • norethindrone (MICRONOR) 0.35 MG tablet Take 1 tablet (0.35 mg total) by mouth daily   • [DISCONTINUED] traZODone (DESYREL) 100 mg tablet Take 200 mg by mouth       Objective     /78 (BP Location: Left arm, Patient Position: Sitting, Cuff Size: Adult)   Pulse 73   Temp 98.6 °F (37 °C) (Tympanic)   Resp 16   Ht 5' 4\" (1.626 m)   Wt 83.9 kg (185 lb)   SpO2 98%   BMI 31.76 kg/m²     Physical Exam  Constitutional:       General: She is not in acute distress.     Appearance: Normal appearance. She is not ill-appearing or toxic-appearing.   HENT:      Head: Normocephalic and atraumatic.      Right Ear: Tympanic membrane normal.      Left Ear: Tympanic membrane normal.      Mouth/Throat:      Mouth: Mucous membranes are moist.   Eyes:      Extraocular Movements: Extraocular movements intact.   Cardiovascular:      Rate and Rhythm: Normal rate and regular rhythm.      Heart sounds: No murmur heard.  Pulmonary:      Effort: Pulmonary effort is normal. No respiratory distress.      Breath sounds: Normal breath sounds. No stridor. No wheezing, rhonchi or rales.   Abdominal:      Palpations: Abdomen is soft.   Musculoskeletal:      " Right lower leg: No edema.      Left lower leg: No edema.   Skin:     General: Skin is warm.      Comments: Cyst on the right gluteus with pus in the center and fluctuance in the center. Surrounding tissue indurated by 0.5 mm    Neurological:      Mental Status: She is alert and oriented to person, place, and time.      Coordination: Finger-Nose-Finger Test normal.      Gait: Gait normal.      Comments: No tremor   Psychiatric:         Mood and Affect: Mood normal.         Behavior: Behavior normal.   Renita Soares MD

## 2024-03-15 NOTE — ASSESSMENT & PLAN NOTE
Chronic and located on the buttocks   Dermatology is following and is using clindamycin lotion  Not resolving.  F/u jaimie next month

## 2024-03-17 ENCOUNTER — PATIENT MESSAGE (OUTPATIENT)
Dept: FAMILY MEDICINE CLINIC | Facility: CLINIC | Age: 39
End: 2024-03-17

## 2024-03-17 DIAGNOSIS — R25.1 TREMORS OF NERVOUS SYSTEM: ICD-10-CM

## 2024-03-18 RX ORDER — PROPRANOLOL HYDROCHLORIDE 10 MG/1
10 TABLET ORAL 2 TIMES DAILY
Qty: 10 TABLET | Refills: 0 | Status: SHIPPED | OUTPATIENT
Start: 2024-03-18 | End: 2024-03-27

## 2024-03-18 NOTE — PATIENT COMMUNICATION
Can the Propranolol 10 mg be resent to the [facility] [address]? [facility] shows it was received, however Maya is saying they did not fill it. Unless there is an insurance/auth issue?  Please advise

## 2024-03-27 DIAGNOSIS — R25.1 TREMORS OF NERVOUS SYSTEM: ICD-10-CM

## 2024-03-27 RX ORDER — PROPRANOLOL HYDROCHLORIDE 10 MG/1
10 TABLET ORAL 2 TIMES DAILY
Qty: 30 TABLET | Refills: 1 | Status: SHIPPED | OUTPATIENT
Start: 2024-03-27

## 2024-04-10 ENCOUNTER — HOSPITAL ENCOUNTER (OUTPATIENT)
Dept: NON INVASIVE DIAGNOSTICS | Facility: CLINIC | Age: 39
Discharge: HOME | End: 2024-04-10
Payer: COMMERCIAL

## 2024-04-10 VITALS
DIASTOLIC BLOOD PRESSURE: 78 MMHG | WEIGHT: 185 LBS | BODY MASS INDEX: 31.58 KG/M2 | HEIGHT: 64 IN | HEART RATE: 73 BPM | SYSTOLIC BLOOD PRESSURE: 122 MMHG

## 2024-04-10 DIAGNOSIS — Q23.1 BICUSPID AORTIC VALVE: ICD-10-CM

## 2024-04-10 DIAGNOSIS — G25.0 BENIGN HEAD TREMOR: ICD-10-CM

## 2024-04-10 LAB
AORTIC ROOT: 2.8 CM
AORTIC VALVE MEAN VELOCITY: 24 M/S
APICAL FOUR CHAMBER EJECTION FRACTION: 68 %
ASCENDING AORTA: 4.1 CM
AV AREA BY CONTINUOUS VTI: 1.2 CM2
AV AREA PEAK VELOCITY: 1.2 CM2
AV LVOT MEAN GRADIENT: 3 MMHG
AV LVOT PEAK GRADIENT: 5 MMHG
AV MEAN GRADIENT: 26 MMHG
AV PEAK GRADIENT: 44 MMHG
AV VALVE AREA: 1.24 CM2
AV VELOCITY RATIO: 0.33
BSA FOR ECHO PROCEDURE: 1.89 M2
DOP CALC AO PEAK VEL: 3.32 M/S
DOP CALC AO VTI: 78.28 CM
DOP CALC LVOT AREA: 3.8 CM2
DOP CALC LVOT CARDIAC INDEX: 3.23 L/MIN/M2
DOP CALC LVOT CARDIAC OUTPUT: 6.1 L/MIN
DOP CALC LVOT DIAMETER: 2.2 CM
DOP CALC LVOT PEAK VEL VTI: 25.61 CM
DOP CALC LVOT PEAK VEL: 1.08 M/S
DOP CALC LVOT STROKE INDEX: 50.3 ML/M2
DOP CALC LVOT STROKE VOLUME: 97.3
E WAVE DECELERATION TIME: 193 MS
E/A RATIO: 1.56
FRACTIONAL SHORTENING: 36 (ref 28–44)
INTERVENTRICULAR SEPTUM IN DIASTOLE (PARASTERNAL SHORT AXIS VIEW): 0.7 CM
INTERVENTRICULAR SEPTUM: 0.7 CM (ref 0.6–1.1)
LAAS-AP2: 17.2 CM2
LAAS-AP4: 15.5 CM2
LEFT ATRIUM SIZE: 2.6 CM
LEFT ATRIUM VOLUME (MOD BIPLANE): 45 ML
LEFT ATRIUM VOLUME INDEX (MOD BIPLANE): 23.8 ML/M2
LEFT INTERNAL DIMENSION IN SYSTOLE: 2.9 CM (ref 2.1–4)
LEFT VENTRICULAR INTERNAL DIMENSION IN DIASTOLE: 4.5 CM (ref 3.5–6)
LEFT VENTRICULAR POSTERIOR WALL IN END DIASTOLE: 1.3 CM
LEFT VENTRICULAR STROKE VOLUME: 60 ML
LVSV (TEICH): 60 ML
MV E'TISSUE VEL-SEP: 10 CM/S
MV PEAK A VEL: 0.63 M/S
MV PEAK E VEL: 98 CM/S
MV STENOSIS PRESSURE HALF TIME: 56 MS
MV VALVE AREA P 1/2 METHOD: 3.93
RIGHT ATRIUM AREA SYSTOLE A4C: 14.9 CM2
RIGHT VENTRICLE ID DIMENSION: 3.2 CM
SINOTUBULAR JUNCTION: 2.4 CM
SL CV LEFT ATRIUM LENGTH A2C: 4.9 CM
SL CV LV EF: 60
SL CV PED ECHO LEFT VENTRICLE DIASTOLIC VOLUME (MOD BIPLANE) 2D: 91 ML
SL CV PED ECHO LEFT VENTRICLE SYSTOLIC VOLUME (MOD BIPLANE) 2D: 32 ML
SL CV SINUS OF VALSALVA 2D: 2.8 CM
STJ: 2.4 CM
TR MAX PG: 23 MMHG
TR PEAK VELOCITY: 2.4 M/S
TRICUSPID ANNULAR PLANE SYSTOLIC EXCURSION: 2.4 CM
TRICUSPID VALVE PEAK REGURGITATION VELOCITY: 2.39 M/S

## 2024-04-10 PROCEDURE — 93306 TTE W/DOPPLER COMPLETE: CPT | Performed by: INTERNAL MEDICINE

## 2024-04-10 PROCEDURE — 93306 TTE W/DOPPLER COMPLETE: CPT

## 2024-04-16 DIAGNOSIS — G47.09 OTHER INSOMNIA: ICD-10-CM

## 2024-04-16 NOTE — PROGRESS NOTES
Assessment/Plan   Problem List Items Addressed This Visit    None  Visit Diagnoses       Well woman exam    -  Primary    Encounter for surveillance of contraceptive pills        Relevant Medications    norethindrone (MICRONOR) 0.35 MG tablet            Discussion    All questions have been answered to her satisfaction  RTO for APE or sooner if needed  Pap deferred.      Subjective     HPI   Maya Ruiz is a 38 y.o. female who presents for annual well woman exam.     Pt taking POP. Will get menses approx q 2 mths. Periods typically lighter, shorter and more tolerable.     No vulvar itch/burn; No vaginal itch/burn; No abn discharge or odor; No urinary sx - burning/pain/frequency/hematuria    No concerning breast masses, asymmetry, nipple discharge or bleeding, changes in skin of breast, or breast tenderness bilaterally    No abd/pelvic pain or HAs;     Pt is sexually active in a mutually monog/ sexual relationship x 5 years.  No issues with intercourse; She declines sti/hiv/hep testing; Feels safe at home  Current contraception: POP     (+) PCP for routine Bw/care;    Last Pap : 23 WNL (HPV reflex)  History of abnormal Pap smear: 1 abnormal-colpo done-f/u WNL since      Review of Systems   Constitutional: Negative.    Respiratory: Negative.     Gastrointestinal: Negative.    Endocrine: Negative.    Genitourinary: Negative.        The following portions of the patient's history were reviewed and updated as appropriate: allergies, current medications, past family history, past medical history, past social history, past surgical history, and problem list.         OB History          0    Para   0    Term   0       0    AB   0    Living   0         SAB   0    IAB   0    Ectopic   0    Multiple   0    Live Births   0                 Past Medical History:   Diagnosis Date    Bacterial vaginosis 2016    HPV (human papilloma virus) infection     (+)non 16, 18 in  negative         Past Surgical History:   Procedure Laterality Date    CYST REMOVAL      cyst in groin       Family History   Problem Relation Age of Onset    No Known Problems Mother     No Known Problems Father        Social History     Socioeconomic History    Marital status: Single     Spouse name: Not on file    Number of children: 0    Years of education: Not on file    Highest education level: High school graduate   Occupational History    Occupation:    Tobacco Use    Smoking status: Former     Current packs/day: 0.00     Average packs/day: 0.3 packs/day for 15.0 years (3.8 ttl pk-yrs)     Types: Cigarettes     Quit date: 2023     Years since quittin.3    Smokeless tobacco: Never    Tobacco comments:     socially 1 pack every 2 weeks.    Vaping Use    Vaping status: Never Used   Substance and Sexual Activity    Alcohol use: Yes     Comment: 2 drinks/wk    Drug use: No    Sexual activity: Yes     Partners: Male     Birth control/protection: OCP   Other Topics Concern    Not on file   Social History Narrative    Not on file     Social Determinants of Health     Financial Resource Strain: Not on file   Food Insecurity: Not on file   Transportation Needs: Not on file   Physical Activity: Not on file   Stress: Not on file   Social Connections: Not on file   Intimate Partner Violence: Not on file   Housing Stability: Not on file         Current Outpatient Medications:     minocycline (MINOCIN) 100 mg capsule, TAKE 1 CAPSULE BY MOUTH DAILY AN HOUR BEFORE FOOD OR ON AN EMPTY STOMACH FOR 1 MONTH AS NEEDED FOR ACNE. MAX OF 1 DOSE PER DAY, Disp: , Rfl:     multivitamin (THERAGRAN) TABS, Take 1 tablet by mouth daily, Disp: , Rfl:     norethindrone (MICRONOR) 0.35 MG tablet, Take 1 tablet (0.35 mg total) by mouth daily, Disp: 84 tablet, Rfl: 4    traZODone (DESYREL) 100 mg tablet, Take 2 tablets (200 mg total) by mouth daily at bedtime, Disp: 60 tablet, Rfl: 1    clindamycin (CLEOCIN T) 1 % lotion,  "APPLY TOPICALLY TO THE AFFECTED AREA DAILY FOR BOILS, Disp: , Rfl:     propranolol (INDERAL) 10 mg tablet, TAKE 1 TABLET(10 MG) BY MOUTH TWICE DAILY, Disp: 30 tablet, Rfl: 1    Allergies   Allergen Reactions    Metronidazole Hives and Rash       Objective   Vitals:    04/17/24 0852   BP: 124/86   BP Location: Left arm   Patient Position: Sitting   Cuff Size: Standard   Weight: 83.9 kg (185 lb)   Height: 5' 4\" (1.626 m)     Physical Exam  Vitals reviewed.   HENT:      Head: Normocephalic and atraumatic.   Cardiovascular:      Rate and Rhythm: Normal rate and regular rhythm.      Heart sounds: Murmur heard.   Pulmonary:      Effort: Pulmonary effort is normal.      Breath sounds: Normal breath sounds.   Chest:   Breasts:     Breasts are symmetrical.      Right: No swelling, bleeding, inverted nipple, mass, nipple discharge, skin change or tenderness.      Left: No swelling, bleeding, inverted nipple, mass, nipple discharge, skin change or tenderness.   Abdominal:      General: Abdomen is flat. Bowel sounds are normal.      Palpations: Abdomen is soft.      Tenderness: There is no abdominal tenderness. There is no right CVA tenderness, left CVA tenderness or guarding.   Genitourinary:     General: Normal vulva.      Pubic Area: No rash.       Labia:         Right: No rash, tenderness, lesion or injury.         Left: No rash, tenderness, lesion or injury.       Urethra: No prolapse, urethral pain, urethral swelling or urethral lesion.      Vagina: Normal. No signs of injury and foreign body. No vaginal discharge or erythema.      Cervix: Normal.      Uterus: Normal.       Adnexa: Right adnexa normal and left adnexa normal.   Musculoskeletal:      Cervical back: Neck supple.   Lymphadenopathy:      Upper Body:      Right upper body: No axillary adenopathy.      Left upper body: No axillary adenopathy.   Skin:     General: Skin is warm and dry.   Neurological:      Mental Status: She is alert and oriented to person, " place, and time.   Psychiatric:         Mood and Affect: Mood normal.         Behavior: Behavior normal.         Thought Content: Thought content normal.         Judgment: Judgment normal.         There are no Patient Instructions on file for this visit.

## 2024-04-17 ENCOUNTER — OFFICE VISIT (OUTPATIENT)
Dept: OBGYN CLINIC | Facility: CLINIC | Age: 39
End: 2024-04-17
Payer: COMMERCIAL

## 2024-04-17 VITALS
DIASTOLIC BLOOD PRESSURE: 86 MMHG | HEIGHT: 64 IN | BODY MASS INDEX: 31.58 KG/M2 | SYSTOLIC BLOOD PRESSURE: 124 MMHG | WEIGHT: 185 LBS

## 2024-04-17 DIAGNOSIS — Z01.419 WELL WOMAN EXAM: Primary | ICD-10-CM

## 2024-04-17 DIAGNOSIS — Z30.41 ENCOUNTER FOR SURVEILLANCE OF CONTRACEPTIVE PILLS: ICD-10-CM

## 2024-04-17 PROCEDURE — S0612 ANNUAL GYNECOLOGICAL EXAMINA: HCPCS | Performed by: PHYSICIAN ASSISTANT

## 2024-04-17 RX ORDER — MINOCYCLINE HYDROCHLORIDE 100 MG/1
CAPSULE ORAL
COMMUNITY
Start: 2024-04-10

## 2024-04-17 RX ORDER — TRAZODONE HYDROCHLORIDE 100 MG/1
200 TABLET ORAL
Qty: 60 TABLET | Refills: 5 | Status: SHIPPED | OUTPATIENT
Start: 2024-04-17 | End: 2024-04-18 | Stop reason: SDUPTHER

## 2024-04-17 RX ORDER — ACETAMINOPHEN AND CODEINE PHOSPHATE 120; 12 MG/5ML; MG/5ML
0.35 SOLUTION ORAL DAILY
Qty: 84 TABLET | Refills: 4 | Status: SHIPPED | OUTPATIENT
Start: 2024-04-17

## 2024-04-18 DIAGNOSIS — G47.09 OTHER INSOMNIA: ICD-10-CM

## 2024-04-19 RX ORDER — TRAZODONE HYDROCHLORIDE 100 MG/1
200 TABLET ORAL
Qty: 180 TABLET | Refills: 1 | Status: SHIPPED | OUTPATIENT
Start: 2024-04-19

## 2024-04-22 DIAGNOSIS — R25.1 TREMORS OF NERVOUS SYSTEM: ICD-10-CM

## 2024-04-22 RX ORDER — PROPRANOLOL HYDROCHLORIDE 10 MG/1
10 TABLET ORAL 2 TIMES DAILY
Qty: 60 TABLET | Refills: 5 | Status: SHIPPED | OUTPATIENT
Start: 2024-04-22

## 2024-04-24 ENCOUNTER — OFFICE VISIT (OUTPATIENT)
Dept: FAMILY MEDICINE CLINIC | Facility: CLINIC | Age: 39
End: 2024-04-24
Payer: COMMERCIAL

## 2024-04-24 VITALS
OXYGEN SATURATION: 99 % | TEMPERATURE: 97.6 F | SYSTOLIC BLOOD PRESSURE: 124 MMHG | DIASTOLIC BLOOD PRESSURE: 84 MMHG | HEIGHT: 64 IN | HEART RATE: 72 BPM | RESPIRATION RATE: 16 BRPM | BODY MASS INDEX: 31.65 KG/M2 | WEIGHT: 185.4 LBS

## 2024-04-24 DIAGNOSIS — Q23.1 BICUSPID AORTIC VALVE: ICD-10-CM

## 2024-04-24 DIAGNOSIS — M79.18 MYOFASCIAL PAIN: Primary | ICD-10-CM

## 2024-04-24 DIAGNOSIS — Q23.0 AORTIC STENOSIS DUE TO BICUSPID AORTIC VALVE: ICD-10-CM

## 2024-04-24 DIAGNOSIS — Q23.1 AORTIC STENOSIS DUE TO BICUSPID AORTIC VALVE: ICD-10-CM

## 2024-04-24 DIAGNOSIS — I71.21 ANEURYSM OF ASCENDING AORTA WITHOUT RUPTURE (HCC): ICD-10-CM

## 2024-04-24 DIAGNOSIS — R25.1 TREMORS OF NERVOUS SYSTEM: ICD-10-CM

## 2024-04-24 PROCEDURE — 99214 OFFICE O/P EST MOD 30 MIN: CPT | Performed by: FAMILY MEDICINE

## 2024-04-24 RX ORDER — CYCLOBENZAPRINE HCL 5 MG
5 TABLET ORAL
Qty: 14 TABLET | Refills: 0 | Status: SHIPPED | OUTPATIENT
Start: 2024-04-24 | End: 2024-05-08

## 2024-04-24 NOTE — PROGRESS NOTES
Name: Maya Ruiz      : 1985      MRN: [id number]  Encounter Provider: Renita Soares MD  Encounter Date: 2024   Encounter department: [facility]    Assessment & Plan     Continues to have head bobbing which is very subtle and rhythmic. Reviewed ECHO. Pt has AS w/ biscuspid valve as well as a mildly dilated aortic valve. BP acceptable. Refer to cardiology to establish care. Denies dizziness, SOB, or CP. Head movement may be muscular. Try muscle relaxer. Would also consider lowering trazodone since the sx started after starting this med and neurology referral if muscle relaxer ineffective     1. Myofascial pain  -     cyclobenzaprine (FLEXERIL) 5 mg tablet; Take 1 tablet (5 mg total) by mouth daily at bedtime for 14 days    2. Tremors of nervous system    3. Bicuspid aortic valve    4. Aortic stenosis due to bicuspid aortic valve  -     Ambulatory Referral to Cardiology; Future    5. Aneurysm of ascending aorta without rupture (HCC)           Subjective      Still having the head bobbing  Describes is as pulsatile when she lays down  Occurs at rest  Started after she started trazodone but holding it for a week didn't make a difference  No headaches or blurry vision  Tried propanolol but didn't notice a difference  Now taking trazodone 200 mg which helps to fall asleep but not stay asleep   Will get about 5 hours  Doesn't hear any pulsatile tinnitus  Reviewed echo which showed moderate AS      Review of Systems    Current Outpatient Medications on File Prior to Visit   Medication Sig    minocycline (MINOCIN) 100 mg capsule TAKE 1 CAPSULE BY MOUTH DAILY AN HOUR BEFORE FOOD OR ON AN EMPTY STOMACH FOR 1 MONTH AS NEEDED FOR ACNE. MAX OF 1 DOSE PER DAY    multivitamin (THERAGRAN) TABS Take 1 tablet by mouth daily    norethindrone (MICRONOR) 0.35 MG tablet Take 1 tablet (0.35 mg total) by mouth daily    traZODone (DESYREL) 100 mg tablet Take 2 tablets (200 mg total) by mouth daily at  "bedtime    clindamycin (CLEOCIN T) 1 % lotion APPLY TOPICALLY TO THE AFFECTED AREA DAILY FOR BOILS    propranolol (INDERAL) 10 mg tablet TAKE 1 TABLET(10 MG) BY MOUTH TWICE DAILY (Patient not taking: Reported on 4/24/2024)       Objective     /84 (BP Location: Left arm, Patient Position: Sitting, Cuff Size: Adult)   Pulse 72   Temp 97.6 °F (36.4 °C) (Tympanic)   Resp 16   Ht 5' 4\" (1.626 m)   Wt 84.1 kg (185 lb 6.4 oz)   LMP  (LMP Unknown)   SpO2 99%   BMI 31.82 kg/m²     Physical Exam  Constitutional:       General: She is not in acute distress.     Appearance: Normal appearance. She is not ill-appearing or toxic-appearing.   HENT:      Head: Normocephalic and atraumatic.   Eyes:      Extraocular Movements: Extraocular movements intact.   Neck:      Vascular: Carotid bruit present.   Cardiovascular:      Rate and Rhythm: Normal rate.      Heart sounds: Murmur (3/6 murmur) heard.      No friction rub. No gallop.   Pulmonary:      Effort: Pulmonary effort is normal. No respiratory distress.      Breath sounds: Normal breath sounds. No stridor. No wheezing, rhonchi or rales.   Musculoskeletal:      Right lower leg: No edema.      Left lower leg: No edema.   Neurological:      Mental Status: She is alert.     Renita Soares MD    "

## 2024-04-29 ENCOUNTER — PATIENT MESSAGE (OUTPATIENT)
Dept: FAMILY MEDICINE CLINIC | Facility: CLINIC | Age: 39
End: 2024-04-29

## 2024-04-29 DIAGNOSIS — G47.09 OTHER INSOMNIA: Primary | ICD-10-CM

## 2024-05-07 RX ORDER — ZOLPIDEM TARTRATE 5 MG/1
5 TABLET ORAL
Qty: 15 TABLET | Refills: 0 | Status: SHIPPED | OUTPATIENT
Start: 2024-05-07 | End: 2024-05-16 | Stop reason: SDUPTHER

## 2024-05-16 DIAGNOSIS — G47.09 OTHER INSOMNIA: ICD-10-CM

## 2024-05-16 DIAGNOSIS — R00.2 PALPITATIONS: Primary | ICD-10-CM

## 2024-05-16 DIAGNOSIS — F41.9 ANXIETY: ICD-10-CM

## 2024-05-16 DIAGNOSIS — R25.0 ABNORMAL HEAD MOVEMENTS: ICD-10-CM

## 2024-05-17 RX ORDER — ZOLPIDEM TARTRATE 10 MG/1
10 TABLET ORAL
Qty: 15 TABLET | Refills: 0 | Status: SHIPPED | OUTPATIENT
Start: 2024-05-17

## 2024-05-17 NOTE — TELEPHONE ENCOUNTER
Medication:  PDMP   05/08/2024 05/07/2024 Zolpidem Tartrate (Tablet) 15.0 15 5 MG NA JACOB GARSIA     Active agreement on file -No          Pt found in room with blood on sheets, pt gown, and on the floor  When pt was asked about what happened, he reported that he had a nose bleed  Vascular surgery was notified during rounding  Will continue to monitor for further bleeding

## 2024-05-20 ENCOUNTER — CONSULT (OUTPATIENT)
Dept: CARDIOLOGY CLINIC | Facility: CLINIC | Age: 39
End: 2024-05-20
Payer: COMMERCIAL

## 2024-05-20 VITALS
DIASTOLIC BLOOD PRESSURE: 78 MMHG | TEMPERATURE: 97.3 F | WEIGHT: 181.6 LBS | BODY MASS INDEX: 31 KG/M2 | HEART RATE: 73 BPM | HEIGHT: 64 IN | OXYGEN SATURATION: 99 % | SYSTOLIC BLOOD PRESSURE: 118 MMHG

## 2024-05-20 DIAGNOSIS — Q23.1 BICUSPID AORTIC VALVE: Primary | ICD-10-CM

## 2024-05-20 DIAGNOSIS — I77.819 ACQUIRED DILATION OF ASCENDING AORTA AND AORTIC ROOT (HCC): ICD-10-CM

## 2024-05-20 PROCEDURE — 93000 ELECTROCARDIOGRAM COMPLETE: CPT | Performed by: INTERNAL MEDICINE

## 2024-05-20 PROCEDURE — 99244 OFF/OP CNSLTJ NEW/EST MOD 40: CPT | Performed by: INTERNAL MEDICINE

## 2024-05-20 NOTE — PROGRESS NOTES
Cardiology Consultation     Maya Ruiz  [id number]  1985  [facility] [place] [id number] [facility] [address] [phone]      1. Bicuspid aortic valve  POCT ECG    Echo complete w/ contrast if indicated      2. Acquired dilation of ascending aorta and aortic root (HCC)  CT chest wo contrast    Echo complete w/ contrast if indicated          Discussion/Summary:    Bicuspid aortic valve with moderate aortic stenosis:  Discussed the diagnosis in detail with the patient. She is currently asymptomatic. I will follow-up with an echocardiogram again next year. I did discuss the eventual need for surgical correction when she has severe aortic stenosis with symptoms or any other abnormalities which would warrant it. She does not have any significant regurgitation. There is no known disease in first-degree family members. She has no children.      Abnormal sensation in head:  This is most bothersome to her. Overall I feel like it is most likely related to just her hearing her pulse or may be some radiation of her murmur to her carotid. When I auscultate posteriorly, she does have an audible pulse posteriorly. She has a aortic aneurysm ascending. I will check a CT scan to make sure there are no other vascular abnormalities or aneurysm where she would be just feeling this more prominently. Her blood pressure is normal. She had a normal MRI of her brain. She does not have significant AI. Sometimes she feels palpitations with this. I did recommend she get the Holter monitor done and asked her to have me CCed on the results.      Ascending aortic aneurysm:  Check CT scan of the chest for further evaluation and to make sure there are no other significant major dilations in the chest as she has not had other evaluation of it before.    History of Present Illness:    38-year-old female. She was referred to the office today for  bicuspid aortic valve with moderate aortic stenosis on the echocardiogram done in April.  The major symptom the patient feels is a pulsing sensation in the back of her head which has been present since about the beginning of the year. She feels it when she is at rest or when she is laying down particularly at night. It does not necessarily feel hard, or irregular and she is mostly just feeling the pulsation likely related with her pulse and it is very discomforting and uneasy for her.      Patient reports to me that she was told she had a bicuspid valve just in . I am confused though, because at 1 point I mentioned antibiotic prophylaxis and she said she was told when she was younger that she needed that but has not done it recently.    There is no family history of any heart condition including bicuspid valve.    She works in a warehouse. She denies any shortness of breath, symptoms, or limitations.    The echo also showed most recently that her ascending aorta was 41 mm.    As part of the workup for this sensation in her head, she also had an MRI which was unremarkable.    Patient Active Problem List   Diagnosis    Bicuspid aortic valve    Other insomnia    Infected cyst of skin     Past Medical History:   Diagnosis Date    Bacterial vaginosis 2016    HPV (human papilloma virus) infection     (+)non 16, 18 in  negative 2016     Social History     Tobacco Use    Smoking status: Former     Current packs/day: 0.00     Average packs/day: 0.3 packs/day for 15.0 years (3.8 ttl pk-yrs)     Types: Cigarettes     Quit date: 2023     Years since quittin.4    Smokeless tobacco: Never    Tobacco comments:     socially 1 pack every 2 weeks.    Vaping Use    Vaping status: Never Used   Substance Use Topics    Alcohol use: Yes     Comment: 2 drinks/wk    Drug use: No      Family History   Problem Relation Age of Onset    No Known Problems Mother     No Known Problems Father      Past Surgical History:  "  Procedure Laterality Date    CYST REMOVAL  2011    cyst in groin       Current Outpatient Medications:     multivitamin (THERAGRAN) TABS, Take 1 tablet by mouth daily, Disp: , Rfl:     norethindrone (MICRONOR) 0.35 MG tablet, Take 1 tablet (0.35 mg total) by mouth daily, Disp: 84 tablet, Rfl: 4    zolpidem (AMBIEN) 10 mg tablet, Take 1 tablet (10 mg total) by mouth daily at bedtime as needed for sleep, Disp: 15 tablet, Rfl: 0    minocycline (MINOCIN) 100 mg capsule, TAKE 1 CAPSULE BY MOUTH DAILY AN HOUR BEFORE FOOD OR ON AN EMPTY STOMACH FOR 1 MONTH AS NEEDED FOR ACNE. MAX OF 1 DOSE PER DAY (Patient not taking: Reported on 5/20/2024), Disp: , Rfl:   Allergies   Allergen Reactions    Metronidazole Hives and Rash       Vitals:    05/20/24 1508   BP: 118/78   BP Location: Left arm   Patient Position: Sitting   Cuff Size: Adult   Pulse: 73   Temp: (!) 97.3 °F (36.3 °C)   SpO2: 99%   Weight: 82.4 kg (181 lb 9.6 oz)   Height: 5' 4\" (1.626 m)     Vitals:    05/20/24 1508   Weight: 82.4 kg (181 lb 9.6 oz)      Height: 5' 4\" (162.6 cm)   Body mass index is 31.17 kg/m².    Physical Exam:  GENERAL: Alert, well appearing, and in no distress  HEENT:  PERRL, EOMI, no scleral icterus, no conjunctival pallor  NECK:  Supple, No elevated JVP, no thyromegaly, no carotid bruits  HEART: Regular 3/6 BERNA RUSB  LUNGS:  Clear to auscultation bilaterally  ABDOMEN:  Soft, non-tender, positive bowel sounds, no rebound or guarding  EXTREMITIES:  No edema  VASCULAR:  Normal pedal pulses   NEURO: No focal deficits,  SKIN: Normal without suspicious lesions on exposed skin      ROS:  Positive for abnormal pulsation in her head when she is at rest.  Except as noted in HPI, is otherwise reviewed in detail and a 12 point review of systems is negative.    Labs:  Lab Results   Component Value Date    SODIUM 139 12/27/2023    K 4.7 12/27/2023     12/27/2023    CREATININE 0.83 12/27/2023    BUN 18 12/27/2023    CO2 27 12/27/2023    ALT 8 " "12/27/2023    AST 13 12/27/2023    TSH 2.67 12/27/2023       No results found for: \"CHOL\"  No results found for: \"HDL\"  No results found for: \"LDLCALC\"  No results found for: \"TRIG\"    Testing:  Echo 4/2024  Left Ventricle: Left ventricular cavity size is normal. Wall thickness is normal. The left ventricular ejection fraction is 60%. Systolic function is normal. Wall motion is normal. Diastolic function is normal.    Aortic Valve: The aortic valve is congenitally bicuspid with commissural fusion of the right-left coronary cusp. The leaflets are not thickened. The leaflets are mildly calcified. There is moderately reduced mobility. There is moderate stenosis. The aortic valve mean gradient is 26 mmHg. The dimensionless velocity index is 0.33. The aortic valve area is 1.24 cm2.    Aorta: The aortic root is normal in size. The ascending aorta is mildly dilated. The ascending aorta is 4.1 cm.     EKG:  Sinus rhythm. 73 bpm.    "

## 2024-05-29 ENCOUNTER — HOSPITAL ENCOUNTER (OUTPATIENT)
Dept: NON INVASIVE DIAGNOSTICS | Facility: HOSPITAL | Age: 39
Discharge: HOME | End: 2024-05-29
Payer: COMMERCIAL

## 2024-05-29 ENCOUNTER — APPOINTMENT (OUTPATIENT)
Dept: LAB | Facility: CLINIC | Age: 39
End: 2024-05-29
Payer: COMMERCIAL

## 2024-05-29 DIAGNOSIS — R25.0 ABNORMAL HEAD MOVEMENTS: ICD-10-CM

## 2024-05-29 DIAGNOSIS — R00.2 PALPITATIONS: ICD-10-CM

## 2024-05-29 DIAGNOSIS — F41.9 ANXIETY: ICD-10-CM

## 2024-05-29 LAB
ALBUMIN SERPL BCP-MCNC: 4.3 G/DL (ref 3.5–5)
ALP SERPL-CCNC: 71 U/L (ref 34–104)
ALT SERPL W P-5'-P-CCNC: 8 U/L (ref 7–52)
ANION GAP SERPL CALCULATED.3IONS-SCNC: 5 MMOL/L (ref 4–13)
AST SERPL W P-5'-P-CCNC: 12 U/L (ref 13–39)
BASOPHILS # BLD AUTO: 0.01 THOUSANDS/ÂΜL (ref 0–0.1)
BASOPHILS NFR BLD AUTO: 0 % (ref 0–1)
BILIRUB SERPL-MCNC: 0.59 MG/DL (ref 0.2–1)
BUN SERPL-MCNC: 15 MG/DL (ref 5–25)
CALCIUM SERPL-MCNC: 9.2 MG/DL (ref 8.4–10.2)
CHLORIDE SERPL-SCNC: 108 MMOL/L (ref 96–108)
CO2 SERPL-SCNC: 26 MMOL/L (ref 21–32)
CREAT SERPL-MCNC: 0.79 MG/DL (ref 0.6–1.3)
EOSINOPHIL # BLD AUTO: 0.06 THOUSAND/ÂΜL (ref 0–0.61)
EOSINOPHIL NFR BLD AUTO: 1 % (ref 0–6)
ERYTHROCYTE [DISTWIDTH] IN BLOOD BY AUTOMATED COUNT: 12.1 % (ref 11.6–15.1)
GFR SERPL CREATININE-BSD FRML MDRD: 94 ML/MIN/1.73SQ M
GLUCOSE P FAST SERPL-MCNC: 98 MG/DL (ref 65–99)
HCT VFR BLD AUTO: 38.5 % (ref 34.8–46.1)
HGB BLD-MCNC: 12.9 G/DL (ref 11.5–15.4)
IMM GRANULOCYTES # BLD AUTO: 0.01 THOUSAND/UL (ref 0–0.2)
IMM GRANULOCYTES NFR BLD AUTO: 0 % (ref 0–2)
LYMPHOCYTES # BLD AUTO: 1.85 THOUSANDS/ÂΜL (ref 0.6–4.47)
LYMPHOCYTES NFR BLD AUTO: 42 % (ref 14–44)
MAGNESIUM SERPL-MCNC: 1.9 MG/DL (ref 1.9–2.7)
MCH RBC QN AUTO: 31.6 PG (ref 26.8–34.3)
MCHC RBC AUTO-ENTMCNC: 33.5 G/DL (ref 31.4–37.4)
MCV RBC AUTO: 94 FL (ref 82–98)
MONOCYTES # BLD AUTO: 0.33 THOUSAND/ÂΜL (ref 0.17–1.22)
MONOCYTES NFR BLD AUTO: 8 % (ref 4–12)
NEUTROPHILS # BLD AUTO: 2.1 THOUSANDS/ÂΜL (ref 1.85–7.62)
NEUTS SEG NFR BLD AUTO: 49 % (ref 43–75)
NRBC BLD AUTO-RTO: 0 /100 WBCS
PHOSPHATE SERPL-MCNC: 2.7 MG/DL (ref 2.7–4.5)
PLATELET # BLD AUTO: 199 THOUSANDS/UL (ref 149–390)
PMV BLD AUTO: 10.1 FL (ref 8.9–12.7)
POTASSIUM SERPL-SCNC: 4.3 MMOL/L (ref 3.5–5.3)
PROT SERPL-MCNC: 6.9 G/DL (ref 6.4–8.4)
RBC # BLD AUTO: 4.08 MILLION/UL (ref 3.81–5.12)
SODIUM SERPL-SCNC: 139 MMOL/L (ref 135–147)
TSH SERPL DL<=0.05 MIU/L-ACNC: 2.53 UIU/ML (ref 0.45–4.5)
WBC # BLD AUTO: 4.36 THOUSAND/UL (ref 4.31–10.16)

## 2024-05-29 PROCEDURE — 84100 ASSAY OF PHOSPHORUS: CPT

## 2024-05-29 PROCEDURE — 36415 COLL VENOUS BLD VENIPUNCTURE: CPT

## 2024-05-29 PROCEDURE — 85025 COMPLETE CBC W/AUTO DIFF WBC: CPT

## 2024-05-29 PROCEDURE — 93226 XTRNL ECG REC<48 HR SCAN A/R: CPT

## 2024-05-29 PROCEDURE — 83735 ASSAY OF MAGNESIUM: CPT

## 2024-05-29 PROCEDURE — 93225 XTRNL ECG REC<48 HRS REC: CPT

## 2024-05-29 PROCEDURE — 84443 ASSAY THYROID STIM HORMONE: CPT

## 2024-05-29 PROCEDURE — 80053 COMPREHEN METABOLIC PANEL: CPT

## 2024-06-01 DIAGNOSIS — G47.09 OTHER INSOMNIA: ICD-10-CM

## 2024-06-03 RX ORDER — ZOLPIDEM TARTRATE 10 MG/1
10 TABLET ORAL
Qty: 30 TABLET | Refills: 0 | Status: SHIPPED | OUTPATIENT
Start: 2024-06-03

## 2024-06-03 NOTE — TELEPHONE ENCOUNTER
Medication:  PDMP   05/20/2024 05/17/2024 Zolpidem Tartrate (Tablet) 15.0 15 10 MG NA JACOB GARSIA     Active agreement on file -No

## 2024-06-05 ENCOUNTER — HOSPITAL ENCOUNTER (OUTPATIENT)
Dept: CT IMAGING | Facility: HOSPITAL | Age: 39
Discharge: HOME | End: 2024-06-05
Attending: INTERNAL MEDICINE
Payer: COMMERCIAL

## 2024-06-05 ENCOUNTER — PATIENT MESSAGE (OUTPATIENT)
Dept: FAMILY MEDICINE CLINIC | Facility: CLINIC | Age: 39
End: 2024-06-05

## 2024-06-05 DIAGNOSIS — F41.9 ANXIETY: Primary | ICD-10-CM

## 2024-06-05 DIAGNOSIS — I77.819 ACQUIRED DILATION OF ASCENDING AORTA AND AORTIC ROOT (HCC): ICD-10-CM

## 2024-06-05 PROCEDURE — 93227 XTRNL ECG REC<48 HR R&I: CPT | Performed by: INTERNAL MEDICINE

## 2024-06-05 PROCEDURE — 71250 CT THORAX DX C-: CPT

## 2024-06-10 RX ORDER — ESCITALOPRAM OXALATE 5 MG/1
5 TABLET ORAL DAILY
Qty: 30 TABLET | Refills: 3 | Status: SHIPPED | OUTPATIENT
Start: 2024-06-10

## 2024-06-30 DIAGNOSIS — G47.09 OTHER INSOMNIA: ICD-10-CM

## 2024-06-30 DIAGNOSIS — F41.9 ANXIETY: ICD-10-CM

## 2024-07-01 RX ORDER — ESCITALOPRAM OXALATE 10 MG/1
10 TABLET ORAL DAILY
Qty: 90 TABLET | Refills: 1 | Status: SHIPPED | OUTPATIENT
Start: 2024-07-01

## 2024-07-01 RX ORDER — ZOLPIDEM TARTRATE 10 MG/1
10 TABLET ORAL
Qty: 30 TABLET | Refills: 0 | Status: SHIPPED | OUTPATIENT
Start: 2024-07-01

## 2024-07-01 NOTE — TELEPHONE ENCOUNTER
Medication:  PDMP   06/03/2024 06/03/2024 Zolpidem Tartrate (Tablet) 30.0 30 10 MG NA JACOB GARSIA     Active agreement on file -No       Re: Lexapro    Patient comment: As directed if needed i started taking 10mg. My refills are for 5 mg. Should i just get the 5 mg refills an take two or would you like to call a new one in. Ty

## 2024-07-30 DIAGNOSIS — G47.09 OTHER INSOMNIA: ICD-10-CM

## 2024-07-30 NOTE — TELEPHONE ENCOUNTER
Medication:  PDMP   07/02/2024 07/01/2024 Zolpidem Tartrate (Tablet) 30.0 30 10 MG NA JACOB GARSIA     Active agreement on file -No

## 2024-07-31 RX ORDER — ZOLPIDEM TARTRATE 10 MG/1
10 TABLET ORAL
Qty: 30 TABLET | Refills: 0 | Status: SHIPPED | OUTPATIENT
Start: 2024-07-31

## 2024-08-29 DIAGNOSIS — G47.09 OTHER INSOMNIA: ICD-10-CM

## 2024-08-29 RX ORDER — ZOLPIDEM TARTRATE 10 MG/1
10 TABLET ORAL
Qty: 30 TABLET | Refills: 0 | Status: SHIPPED | OUTPATIENT
Start: 2024-08-29

## 2024-08-29 NOTE — TELEPHONE ENCOUNTER
Medication:  PDMP   07/31/2024 07/31/2024 Zolpidem Tartrate (Tablet) 30.0 30 10 MG NA JACOB GARSIA     Active agreement on file -No

## 2024-09-29 DIAGNOSIS — G47.09 OTHER INSOMNIA: ICD-10-CM

## 2024-09-30 RX ORDER — ZOLPIDEM TARTRATE 10 MG/1
10 TABLET ORAL
Qty: 30 TABLET | Refills: 2 | Status: SHIPPED | OUTPATIENT
Start: 2024-09-30

## 2024-09-30 NOTE — TELEPHONE ENCOUNTER
Medication:  PDMP     08/29/2024 08/29/2024 Zolpidem Tartrate (Tablet) 30.0 30 10 MG NA JACOB GARSIA     Active agreement on file -No

## 2024-10-02 DIAGNOSIS — F41.9 ANXIETY: ICD-10-CM

## 2024-10-02 RX ORDER — ESCITALOPRAM OXALATE 10 MG/1
10 TABLET ORAL DAILY
Qty: 90 TABLET | Refills: 1 | Status: SHIPPED | OUTPATIENT
Start: 2024-10-02

## 2024-12-20 DIAGNOSIS — F41.9 ANXIETY: ICD-10-CM

## 2024-12-20 DIAGNOSIS — G47.09 OTHER INSOMNIA: ICD-10-CM

## 2024-12-23 RX ORDER — ESCITALOPRAM OXALATE 10 MG/1
10 TABLET ORAL DAILY
Qty: 90 TABLET | Refills: 0 | Status: SHIPPED | OUTPATIENT
Start: 2024-12-23

## 2024-12-23 RX ORDER — ZOLPIDEM TARTRATE 10 MG/1
10 TABLET ORAL
Qty: 30 TABLET | Refills: 2 | Status: SHIPPED | OUTPATIENT
Start: 2024-12-23

## 2024-12-23 NOTE — TELEPHONE ENCOUNTER
Medication:  PDMP     12/02/2024 09/30/2024 Zolpidem Tartrate (Tablet) 30.0 30 10 MG NA JACOB GARSIA       Active agreement on file -No

## 2025-01-08 ENCOUNTER — PATIENT MESSAGE (OUTPATIENT)
Dept: FAMILY MEDICINE CLINIC | Facility: CLINIC | Age: 40
End: 2025-01-08

## 2025-01-08 DIAGNOSIS — Z12.31 ENCOUNTER FOR SCREENING MAMMOGRAM FOR MALIGNANT NEOPLASM OF BREAST: Primary | ICD-10-CM

## 2025-02-05 ENCOUNTER — PATIENT MESSAGE (OUTPATIENT)
Dept: FAMILY MEDICINE CLINIC | Facility: CLINIC | Age: 40
End: 2025-02-05

## 2025-03-19 DIAGNOSIS — F41.9 ANXIETY: ICD-10-CM

## 2025-03-19 RX ORDER — ESCITALOPRAM OXALATE 10 MG/1
10 TABLET ORAL DAILY
Qty: 90 TABLET | Refills: 0 | Status: SHIPPED | OUTPATIENT
Start: 2025-03-19

## 2025-03-26 ENCOUNTER — OFFICE VISIT (OUTPATIENT)
Dept: CARDIOLOGY CLINIC | Facility: CLINIC | Age: 40
End: 2025-03-26
Payer: COMMERCIAL

## 2025-03-26 VITALS
DIASTOLIC BLOOD PRESSURE: 70 MMHG | WEIGHT: 180 LBS | HEART RATE: 60 BPM | OXYGEN SATURATION: 99 % | SYSTOLIC BLOOD PRESSURE: 120 MMHG | HEIGHT: 64 IN | BODY MASS INDEX: 30.73 KG/M2 | TEMPERATURE: 97.8 F

## 2025-03-26 DIAGNOSIS — Q23.81 BICUSPID AORTIC VALVE: Primary | ICD-10-CM

## 2025-03-26 DIAGNOSIS — I71.21 ANEURYSM OF ASCENDING AORTA WITHOUT RUPTURE (HCC): ICD-10-CM

## 2025-03-26 PROCEDURE — 99214 OFFICE O/P EST MOD 30 MIN: CPT | Performed by: INTERNAL MEDICINE

## 2025-03-26 NOTE — PROGRESS NOTES
Cardiology Follow Up     Maya Ruiz  [id number]  1985  [facility]  [facility] [place]  [address] [facility] [address] [phone]      1. Bicuspid aortic valve  Echo complete w/ contrast if indicated      2. Aneurysm of ascending aorta without rupture (HCC)            Discussion/Summary:    Bicuspid aortic valve with moderate aortic stenosis:  Currently remains asymptomatic. Repeat echo for surveillance is scheduled for the middle of May. I will communicate with her after these results are available. Based on exam findings today, I do not think there will be progression to severe aortic stenosis. Regardless, she remains asymptomatic currently.  We did discuss potential for surgical correction if necessary. Discussed that at her young age if she required surgery currently, would likely be mechanical valve.    Ascending aortic aneurysm:  Repeat echo is pending. On CT scan, this was 43 mm. Blood pressure remains controlled.    History of Present Illness:  39-year-old female. Bicuspid aortic valve with moderate aortic stenosis.  When I first met her, she was feeling a pulsating sensation in her head, but this has improved.    Patient reports to me that she was told she had a bicuspid valve just in 2020.  There is no family history of any heart condition including bicuspid valve.    The echo also showed most recently that her ascending aorta was 41 mm.    As part of the workup for this sensation in her head, she also had an MRI which was unremarkable.    Interval History:  She returns for follow-up today. Pulsation in her head is less prominent likely because we discussed it and we felt it was benign. She is trying to do a little more exercise. Working out at the gym but not doing any heavy lifting. Doing some aerobic sometimes 1 hour of walking includes some hills and while she does feel a little bit fatigued, no significant  "shortness of breath, chest discomfort, dizziness, or other symptoms suggestive of significant progression of the aortic stenosis.    Repeat echocardiogram is scheduled for about a month and a half from now.    Patient Active Problem List   Diagnosis    Bicuspid aortic valve    Other insomnia    Infected cyst of skin    Aneurysm of ascending aorta without rupture (HCC)     Past Medical History:   Diagnosis Date    Bacterial vaginosis 2016    HPV (human papilloma virus) infection     (+)non 16, 18 in  negative      Social History     Tobacco Use    Smoking status: Former     Current packs/day: 0.00     Average packs/day: 0.3 packs/day for 15.0 years (3.8 ttl pk-yrs)     Types: Cigarettes     Quit date: 2023     Years since quittin.2    Smokeless tobacco: Never    Tobacco comments:     socially 1 pack every 2 weeks.    Vaping Use    Vaping status: Never Used   Substance Use Topics    Alcohol use: Yes     Comment: 2 drinks/wk    Drug use: No      Family History   Problem Relation Age of Onset    No Known Problems Mother     No Known Problems Father      Past Surgical History:   Procedure Laterality Date    CYST REMOVAL      cyst in groin       Current Outpatient Medications:     escitalopram (LEXAPRO) 10 mg tablet, Take 1 tablet (10 mg total) by mouth daily, Disp: 90 tablet, Rfl: 0    norethindrone (MICRONOR) 0.35 MG tablet, Take 1 tablet (0.35 mg total) by mouth daily, Disp: 84 tablet, Rfl: 4    zolpidem (AMBIEN) 10 mg tablet, Take 1 tablet (10 mg total) by mouth daily at bedtime as needed for sleep, Disp: 30 tablet, Rfl: 2  Allergies   Allergen Reactions    Metronidazole Hives and Rash       Vitals:    25 0909   BP: 120/70   BP Location: Left arm   Patient Position: Sitting   Cuff Size: Standard   Pulse: 60   Temp: 97.8 °F (36.6 °C)   TempSrc: Tympanic   SpO2: 99%   Weight: 81.6 kg (180 lb)   Height: 5' 4\" (1.626 m)     Vitals:    25 0909   Weight: 81.6 kg (180 lb)      Height: " "5' 4\" (162.6 cm)   Body mass index is 30.9 kg/m².    Physical Exam:  GEN: Maya Ruiz appears well, alert and oriented x 3, pleasant and cooperative   HEENT: pupils equal, round, and reactive to light; extraocular muscles intact  NECK: supple, no carotid bruits   HEART: regular rhythm, 3/6 BERNA. Preserved S2.  LUNGS: clear to auscultation bilaterally; no wheezes, rales, or rhonchi   ABDOMEN: normal bowel sounds, soft, no tenderness, no distention  EXTREMITIES: peripheral pulses normal; no clubbing, cyanosis, or edema  NEURO: no focal findings   SKIN: normal without suspicious lesions on exposed skin      ROS:  Except as noted in HPI, is otherwise reviewed in detail and a 12 point review of systems is negative.  ROS reviewed and is unchanged    Labs:  Lab Results   Component Value Date    SODIUM 139 05/29/2024    K 4.3 05/29/2024     05/29/2024    CREATININE 0.79 05/29/2024    BUN 15 05/29/2024    CO2 26 05/29/2024    ALT 8 05/29/2024    AST 12 (L) 05/29/2024    TSH 2.67 12/27/2023    GLUF 98 05/29/2024    WBC 4.36 05/29/2024    HGB 12.9 05/29/2024    HCT 38.5 05/29/2024     05/29/2024       No results found for: \"CHOL\"  No results found for: \"HDL\"  No results found for: \"LDLCALC\"  No results found for: \"TRIG\"    Testing:  Echo 4/2024  Left Ventricle: Left ventricular cavity size is normal. Wall thickness is normal. The left ventricular ejection fraction is 60%. Systolic function is normal. Wall motion is normal. Diastolic function is normal.    Aortic Valve: The aortic valve is congenitally bicuspid with commissural fusion of the right-left coronary cusp. The leaflets are not thickened. The leaflets are mildly calcified. There is moderately reduced mobility. There is moderate stenosis. The aortic valve mean gradient is 26 mmHg. The dimensionless velocity index is 0.33. The aortic valve area is 1.24 cm2.    Aorta: The aortic root is normal in size. The ascending aorta is mildly dilated. The ascending " aorta is 4.1 cm.

## 2025-04-02 ENCOUNTER — OFFICE VISIT (OUTPATIENT)
Dept: FAMILY MEDICINE CLINIC | Facility: CLINIC | Age: 40
End: 2025-04-02
Payer: COMMERCIAL

## 2025-04-02 ENCOUNTER — APPOINTMENT (OUTPATIENT)
Dept: LAB | Facility: CLINIC | Age: 40
End: 2025-04-02
Payer: COMMERCIAL

## 2025-04-02 VITALS
DIASTOLIC BLOOD PRESSURE: 80 MMHG | RESPIRATION RATE: 18 BRPM | BODY MASS INDEX: 31.82 KG/M2 | TEMPERATURE: 97.3 F | OXYGEN SATURATION: 98 % | HEIGHT: 64 IN | WEIGHT: 186.4 LBS | SYSTOLIC BLOOD PRESSURE: 102 MMHG | HEART RATE: 72 BPM

## 2025-04-02 DIAGNOSIS — G47.09 OTHER INSOMNIA: ICD-10-CM

## 2025-04-02 DIAGNOSIS — I71.21 ANEURYSM OF ASCENDING AORTA WITHOUT RUPTURE (HCC): ICD-10-CM

## 2025-04-02 DIAGNOSIS — Z11.59 NEED FOR HEPATITIS C SCREENING TEST: ICD-10-CM

## 2025-04-02 DIAGNOSIS — I35.0 AORTIC STENOSIS DUE TO BICUSPID AORTIC VALVE: ICD-10-CM

## 2025-04-02 DIAGNOSIS — Z00.00 ANNUAL PHYSICAL EXAM: Primary | ICD-10-CM

## 2025-04-02 DIAGNOSIS — Z00.00 ANNUAL PHYSICAL EXAM: ICD-10-CM

## 2025-04-02 DIAGNOSIS — F41.9 ANXIETY: ICD-10-CM

## 2025-04-02 DIAGNOSIS — Q23.81 AORTIC STENOSIS DUE TO BICUSPID AORTIC VALVE: ICD-10-CM

## 2025-04-02 LAB
ALBUMIN SERPL BCG-MCNC: 4.5 G/DL (ref 3.5–5)
ALP SERPL-CCNC: 79 U/L (ref 34–104)
ALT SERPL W P-5'-P-CCNC: 12 U/L (ref 7–52)
ANION GAP SERPL CALCULATED.3IONS-SCNC: 5 MMOL/L (ref 4–13)
AST SERPL W P-5'-P-CCNC: 17 U/L (ref 13–39)
BILIRUB SERPL-MCNC: 0.7 MG/DL (ref 0.2–1)
BUN SERPL-MCNC: 12 MG/DL (ref 5–25)
CALCIUM SERPL-MCNC: 9.3 MG/DL (ref 8.4–10.2)
CHLORIDE SERPL-SCNC: 108 MMOL/L (ref 96–108)
CHOLEST SERPL-MCNC: 186 MG/DL (ref ?–200)
CO2 SERPL-SCNC: 28 MMOL/L (ref 21–32)
CREAT SERPL-MCNC: 0.8 MG/DL (ref 0.6–1.3)
GFR SERPL CREATININE-BSD FRML MDRD: 92 ML/MIN/1.73SQ M
GLUCOSE P FAST SERPL-MCNC: 95 MG/DL (ref 65–99)
HCV AB SER QL: NORMAL
HDLC SERPL-MCNC: 66 MG/DL
LDLC SERPL CALC-MCNC: 106 MG/DL (ref 0–100)
NONHDLC SERPL-MCNC: 120 MG/DL
POTASSIUM SERPL-SCNC: 4.3 MMOL/L (ref 3.5–5.3)
PROT SERPL-MCNC: 7.1 G/DL (ref 6.4–8.4)
SODIUM SERPL-SCNC: 141 MMOL/L (ref 135–147)
TRIGL SERPL-MCNC: 71 MG/DL (ref ?–150)

## 2025-04-02 PROCEDURE — 86803 HEPATITIS C AB TEST: CPT

## 2025-04-02 PROCEDURE — 80053 COMPREHEN METABOLIC PANEL: CPT

## 2025-04-02 PROCEDURE — 36415 COLL VENOUS BLD VENIPUNCTURE: CPT

## 2025-04-02 PROCEDURE — 80061 LIPID PANEL: CPT

## 2025-04-02 PROCEDURE — 99396 PREV VISIT EST AGE 40-64: CPT | Performed by: FAMILY MEDICINE

## 2025-04-02 NOTE — ASSESSMENT & PLAN NOTE
Managed with Ambien. Does wake up 2-3 x a night to urinate. Suggest refraining from drinking water 2 hours before bed and call if this continues. Discussed other diagnoses that could cause nocturia and would investigate

## 2025-04-02 NOTE — ASSESSMENT & PLAN NOTE
CT scan in June of last year showed fusiform ectasia in the thoracic aorta measuring 43mm. 1 year surveillance CT recommended. CT ordered for June   Orders:    CT chest wo contrast; Future

## 2025-04-02 NOTE — PROGRESS NOTES
Adult Annual Physical  Name: Maya Ruiz      : 1985      MRN: [id number]  Encounter Provider: Renita Soares MD  Encounter Date: 2025   Encounter department: [facility]    Assessment & Plan  Annual physical exam  Doing well. Mammogram scheduled later this month. UTD for pap. FBW ordered  Orders:    Lipid panel; Future    Comprehensive metabolic panel; Future    Other insomnia  Managed with Ambien. Does wake up 2-3 x a night to urinate. Suggest refraining from drinking water 2 hours before bed and call if this continues. Discussed other diagnoses that could cause nocturia and would investigate       Anxiety  Improved. Continue lexapro 10 mg daily        Aortic stenosis due to bicuspid aortic valve   systolic ejection murmur       Aneurysm of ascending aorta without rupture (HCC)  CT scan in  of last year showed fusiform ectasia in the thoracic aorta measuring 43mm. 1 year surveillance CT recommended. CT ordered for    Orders:    CT chest wo contrast; Future    Need for hepatitis C screening test    Orders:    Hepatitis C antibody; Future    Preventive Screenings:    - Cervical cancer screening: screening up-to-date     Immunizations:  - Immunizations due: Influenza and Tdap         History of Present Illness     Adult Annual Physical:  Patient presents for annual physical.     Diet and Physical Activity:  - Diet/Nutrition: no special diet.  - Exercise: walking, 3-4 times a week on average and 30-60 minutes on average.    General Health:  - Sleep: 4-6 hours of sleep on average.  - Hearing: normal hearing right ear and normal hearing left ear.  - Vision: no vision problems.  - Dental: regular dental visits, brushes teeth twice daily and floss regularly.    /GYN Health:  - Follows with GYN: yes.   - Menopause: premenopausal.   - Last menstrual cycle: 3/17/2025.   - Contraception: oral contraceptives.      Advanced Care Planning:  - Has an advanced directive?: no    -  "Has a durable medical POA?: no      Review of Systems      Objective   /80 (BP Location: Left arm, Patient Position: Sitting, Cuff Size: Standard)   Pulse 72   Temp (!) 97.3 °F (36.3 °C) (Temporal)   Resp 18   Ht 5' 4\" (1.626 m)   Wt 84.6 kg (186 lb 6.4 oz)   LMP 03/17/2025   SpO2 98%   BMI 32.00 kg/m²     Physical Exam    "

## 2025-04-02 NOTE — PROGRESS NOTES
"Adult Annual Physical  Name: Maya Ruiz      : 1985      MRN: [id number]  Encounter Provider: Renita Soares MD  Encounter Date: 2025   Encounter department: [facility]    Assessment & Plan    Preventive Screenings:    - Cervical cancer screening: screening up-to-date     Immunizations:  - Immunizations due: Influenza and Tdap         History of Present Illness     Adult Annual Physical:  Patient presents for annual physical.     Diet and Physical Activity:  - Diet/Nutrition: no special diet.  - Exercise: walking, 3-4 times a week on average and 30-60 minutes on average.    Depression Screening:  - PHQ-2 Score: 0    General Health:  - Sleep: 4-6 hours of sleep on average.  - Hearing: normal hearing right ear and normal hearing left ear.  - Vision: no vision problems.  - Dental: regular dental visits, brushes teeth twice daily and floss regularly.    /GYN Health:  - Follows with GYN: yes.   - Menopause: premenopausal.   - Last menstrual cycle: 3/17/2025.   - Contraception: oral contraceptives.      Advanced Care Planning:  - Has an advanced directive?: no    - Has a durable medical POA?: no      Review of Systems      Objective   /80 (BP Location: Left arm, Patient Position: Sitting, Cuff Size: Standard)   Pulse 72   Temp (!) 97.3 °F (36.3 °C) (Temporal)   Resp 18   Ht 5' 4\" (1.626 m)   Wt 84.6 kg (186 lb 6.4 oz)   LMP 2025   SpO2 98%   BMI 32.00 kg/m²     Physical Exam    "

## 2025-04-02 NOTE — PATIENT INSTRUCTIONS
"Patient Education     Routine physical for adults   The Basics   Written by the doctors and editors at [facility]   What is a physical? -- A physical is a routine visit, or \"check-up,\" with your doctor. You might also hear it called a \"wellness visit\" or \"preventive visit.\"  During each visit, the doctor will:   Ask about your physical and mental health   Ask about your habits, behaviors, and lifestyle   Do an exam   Give you vaccines if needed   Talk to you about any medicines you take   Give advice about your health   Answer your questions  Getting regular check-ups is an important part of taking care of your health. It can help your doctor find and treat any problems you have. But it's also important for preventing health problems.  A routine physical is different from a \"sick visit.\" A sick visit is when you see a doctor because of a health concern or problem. Since physicals are scheduled ahead of time, you can think about what you want to ask the doctor.  How often should I get a physical? -- It depends on your age and health. In general, for people age 21 years and older:   If you are younger than 50 years, you might be able to get a physical every 3 years.   If you are 50 years or older, your doctor might recommend a physical every year.  If you have an ongoing health condition, like diabetes or high blood pressure, your doctor will probably want to see you more often.  What happens during a physical? -- In general, each visit will include:   Physical exam - The doctor or nurse will check your height, weight, heart rate, and blood pressure. They will also look at your eyes and ears. They will ask about how you are feeling and whether you have any symptoms that bother you.   Medicines - It's a good idea to bring a list of all the medicines you take to each doctor visit. Your doctor will talk to you about your medicines and answer any questions. Tell them if you are having any side effects that bother you. You " "should also tell them if you are having trouble paying for any of your medicines.   Habits and behaviors - This includes:   Your diet   Your exercise habits   Whether you smoke, drink alcohol, or use drugs   Whether you are sexually active   Whether you feel safe at home  Your doctor will talk to you about things you can do to improve your health and lower your risk of health problems. They will also offer help and support. For example, if you want to quit smoking, they can give you advice and might prescribe medicines. If you want to improve your diet or get more physical activity, they can help you with this, too.   Lab tests, if needed - The tests you get will depend on your age and situation. For example, your doctor might want to check your:   Cholesterol   Blood sugar   Iron level   Vaccines - The recommended vaccines will depend on your age, health, and what vaccines you already had. Vaccines are very important because they can prevent certain serious or deadly infections.   Discussion of screening - \"Screening\" means checking for diseases or other health problems before they cause symptoms. Your doctor can recommend screening based on your age, risk, and preferences. This might include tests to check for:   Cancer, such as breast, prostate, cervical, ovarian, colorectal, prostate, lung, or skin cancer   Sexually transmitted infections, such as chlamydia and gonorrhea   Mental health conditions like depression and anxiety  Your doctor will talk to you about the different types of screening tests. They can help you decide which screenings to have. They can also explain what the results might mean.   Answering questions - The physical is a good time to ask the doctor or nurse questions about your health. If needed, they can refer you to other doctors or specialists, too.  Adults older than 65 years often need other care, too. As you get older, your doctor will talk to you about:   How to prevent falling at " home   Hearing or vision tests   Memory testing   How to take your medicines safely   Making sure that you have the help and support you need at home  All topics are updated as new evidence becomes available and our peer review process is complete.  This topic retrieved from [employer] on: May 02, 2024.  Topic 593027 Version 1.0  Release: 32.4.3 - C32.122  © 2024 UpToDate, Inc. and/or its affiliates. All rights reserved.  Consumer Information Use and Disclaimer   Disclaimer: This generalized information is a limited summary of diagnosis, treatment, and/or medication information. It is not meant to be comprehensive and should be used as a tool to help the user understand and/or assess potential diagnostic and treatment options. It does NOT include all information about conditions, treatments, medications, side effects, or risks that may apply to a specific patient. It is not intended to be medical advice or a substitute for the medical advice, diagnosis, or treatment of a health care provider based on the health care provider's examination and assessment of a patient's specific and unique circumstances. Patients must speak with a health care provider for complete information about their health, medical questions, and treatment options, including any risks or benefits regarding use of medications. This information does not endorse any treatments or medications as safe, effective, or approved for treating a specific patient. UpToDate, Inc. and its affiliates disclaim any warranty or liability relating to this information or the use thereof.The use of this information is governed by the Terms of Use, available at https://www.woltersSnowflake Youth Foundationuwer.com/en/know/clinical-effectiveness-terms. 2024© UpToDate, Inc. and its affiliates and/or licensors. All rights reserved.  Copyright   © 2024 UpToDate, Inc. and/or its affiliates. All rights reserved.

## 2025-04-22 DIAGNOSIS — G47.09 OTHER INSOMNIA: ICD-10-CM

## 2025-04-22 RX ORDER — ZOLPIDEM TARTRATE 10 MG/1
10 TABLET ORAL
Qty: 30 TABLET | Refills: 0 | Status: SHIPPED | OUTPATIENT
Start: 2025-04-22

## 2025-04-22 NOTE — TELEPHONE ENCOUNTER
Medication:  PDMP   03/19/2025 12/23/2024 Zolpidem Tartrate (Tablet) 30.0 30 10 MG NA JACOB GARSIA     Active agreement on file -No

## 2025-04-27 DIAGNOSIS — Z30.41 ENCOUNTER FOR SURVEILLANCE OF CONTRACEPTIVE PILLS: ICD-10-CM

## 2025-04-28 DIAGNOSIS — Z30.41 ENCOUNTER FOR SURVEILLANCE OF CONTRACEPTIVE PILLS: ICD-10-CM

## 2025-04-29 RX ORDER — ACETAMINOPHEN AND CODEINE PHOSPHATE 120; 12 MG/5ML; MG/5ML
0.35 SOLUTION ORAL DAILY
Qty: 84 TABLET | Refills: 0 | OUTPATIENT
Start: 2025-04-29

## 2025-04-29 RX ORDER — ACETAMINOPHEN AND CODEINE PHOSPHATE 120; 12 MG/5ML; MG/5ML
1 SOLUTION ORAL DAILY
Qty: 84 TABLET | Refills: 4 | Status: SHIPPED | OUTPATIENT
Start: 2025-04-29

## 2025-04-30 ENCOUNTER — HOSPITAL ENCOUNTER (OUTPATIENT)
Dept: MAMMOGRAPHY | Facility: HOSPITAL | Age: 40
Discharge: HOME | End: 2025-04-30
Payer: COMMERCIAL

## 2025-04-30 VITALS — HEIGHT: 64 IN | BODY MASS INDEX: 31.76 KG/M2 | WEIGHT: 186 LBS

## 2025-04-30 DIAGNOSIS — Z12.31 ENCOUNTER FOR SCREENING MAMMOGRAM FOR MALIGNANT NEOPLASM OF BREAST: ICD-10-CM

## 2025-04-30 PROCEDURE — 77067 SCR MAMMO BI INCL CAD: CPT

## 2025-04-30 PROCEDURE — 77063 BREAST TOMOSYNTHESIS BI: CPT

## 2025-05-06 NOTE — PROGRESS NOTES
Assessment & Plan   Problem List Items Addressed This Visit    None  Visit Diagnoses       Well woman exam    -  Primary      Encounter for surveillance of contraceptive pills        Relevant Medications    norethindrone (MICRONOR) 0.35 MG tablet      Encounter for screening mammogram for malignant neoplasm of breast        Relevant Orders    Mammo screening bilateral w 3d and cad          Discussion    All questions have been answered to her satisfaction  RTO for APE or sooner if needed  Mammo done. Results reviewed w pt. We reviewed heterogeneously dense breasts and option to consider ABUS. Pt declines at this time, but will consider and let us know if she wants us to order it.       Subjective     HPI   Maya Ruiz is a 39 y.o. female who presents for annual well woman exam.     LMP - 04/22/25 ; Periods are somewhat regular on POP, every couple months, and last 3ish days; No excessive bleeding; No intermenstrual bleeding or spotting; Cramps are tolerable.  No vulvar itch/burn; No vaginal itch/burn; No abn discharge or odor; No urinary sx - burning/pain/frequency/hematuria    No concerning breast masses, asymmetry, nipple discharge or bleeding, changes in skin of breast, or breast tenderness bilaterally    No abd/pelvic pain or HAs;     No menopausal symptoms.    Pt is sexually active in a mutually monog/ sexual relationship; No issues with intercourse; She declines sti/hiv/hep testing; Feels safe at home  Current contraception: POP    (+) PCP for routine Bw/care;    Last Pap : 04/2023, NILM, no HPV ordered  History of abnormal Pap smear: hx +HPV ~10 years ago, had colposcopy, normal since  Mammo: 04/2025, BIRADS 1    Review of Systems   Constitutional: Negative.    Respiratory: Negative.     Cardiovascular: Negative.    Gastrointestinal: Negative.    Genitourinary: Negative.    Skin: Negative.    Psychiatric/Behavioral: Negative.         The following portions of the patient's history were reviewed  and updated as appropriate: allergies, current medications, past family history, past medical history, past social history, past surgical history, and problem list.         OB History        0    Para   0    Term   0       0    AB   0    Living   0       SAB   0    IAB   0    Ectopic   0    Multiple   0    Live Births   0                 Past Medical History:   Diagnosis Date   • Bacterial vaginosis 2016   • Heart murmur    • HPV (human papilloma virus) infection     (+)non 16, 18 in  negative        Past Surgical History:   Procedure Laterality Date   • CYST REMOVAL      cyst in groin       Family History   Problem Relation Age of Onset   • No Known Problems Mother    • No Known Problems Father    • No Known Problems Sister    • No Known Problems Maternal Grandmother    • No Known Problems Maternal Grandfather    • Breast cancer Paternal Grandmother    • No Known Problems Paternal Grandfather    • No Known Problems Maternal Aunt    • No Known Problems Maternal Aunt    • No Known Problems Paternal Aunt    • No Known Problems Paternal Aunt        Social History     Socioeconomic History   • Marital status: Single     Spouse name: Not on file   • Number of children: 0   • Years of education: Not on file   • Highest education level: High school graduate   Occupational History   • Occupation:    Tobacco Use   • Smoking status: Former     Current packs/day: 0.00     Average packs/day: 0.3 packs/day for 15.0 years (3.8 ttl pk-yrs)     Types: Cigarettes     Quit date: 2023     Years since quittin.3   • Smokeless tobacco: Never   Vaping Use   • Vaping status: Never Used   Substance and Sexual Activity   • Alcohol use: Yes     Comment: 2 drinks/wk   • Drug use: No   • Sexual activity: Yes     Partners: Male     Birth control/protection: OCP   Other Topics Concern   • Not on file   Social History Narrative   • Not on file     Social Drivers of Health     Financial  Resource Strain: Not on file   Food Insecurity: No Food Insecurity (3/30/2025)    Hunger Vital Sign    • Worried About Running Out of Food in the Last Year: Never true    • Ran Out of Food in the Last Year: Never true   Transportation Needs: No Transportation Needs (3/30/2025)    PRAPARE - Transportation    • Lack of Transportation (Medical): No    • Lack of Transportation (Non-Medical): No   Physical Activity: Not on file   Stress: Not on file   Social Connections: Not on file   Intimate Partner Violence: Not on file   Housing Stability: Low Risk  (3/30/2025)    Housing Stability Vital Sign    • Unable to Pay for Housing in the Last Year: No    • Number of Times Moved in the Last Year: 0    • Homeless in the Last Year: No         Current Outpatient Medications:   •  escitalopram (LEXAPRO) 10 mg tablet, Take 1 tablet (10 mg total) by mouth daily, Disp: 90 tablet, Rfl: 0  •  norethindrone (MICRONOR) 0.35 MG tablet, Take 1 tablet (0.35 mg total) by mouth daily, Disp: 84 tablet, Rfl: 4  •  zolpidem (AMBIEN) 10 mg tablet, Take 1 tablet (10 mg total) by mouth daily at bedtime as needed for sleep, Disp: 30 tablet, Rfl: 0    Allergies   Allergen Reactions   • Metronidazole Hives and Rash       Objective   Vitals:    05/07/25 0749   BP: 120/82   BP Location: Left arm   Patient Position: Sitting   Cuff Size: Standard   Weight: 85.3 kg (188 lb)     Physical Exam  Vitals reviewed.   Constitutional:       Appearance: Normal appearance.   HENT:      Head: Normocephalic and atraumatic.   Cardiovascular:      Rate and Rhythm: Normal rate and regular rhythm.      Heart sounds: Murmur (systolic murmur, pt follows with cardiology) heard.   Pulmonary:      Effort: Pulmonary effort is normal.      Breath sounds: Normal breath sounds.   Chest:   Breasts:     Right: Normal. No swelling, bleeding, inverted nipple, mass, nipple discharge, skin change or tenderness.      Left: Normal. No swelling, bleeding, inverted nipple, mass, nipple  discharge, skin change or tenderness.   Abdominal:      General: Abdomen is flat. Bowel sounds are normal.      Palpations: Abdomen is soft.   Genitourinary:     General: Normal vulva.      Labia:         Right: No rash, tenderness, lesion or injury.         Left: No rash, tenderness, lesion or injury.       Urethra: No prolapse, urethral pain, urethral swelling or urethral lesion.      Vagina: Normal.      Cervix: Normal.      Uterus: Normal.       Adnexa: Right adnexa normal and left adnexa normal.   Skin:     General: Skin is warm and dry.   Neurological:      Mental Status: She is alert.   Psychiatric:         Mood and Affect: Mood normal.         Behavior: Behavior normal.         Thought Content: Thought content normal.         Judgment: Judgment normal.         There are no Patient Instructions on file for this visit.

## 2025-05-07 ENCOUNTER — ANNUAL EXAM (OUTPATIENT)
Dept: OBGYN CLINIC | Facility: CLINIC | Age: 40
End: 2025-05-07
Payer: COMMERCIAL

## 2025-05-07 VITALS — BODY MASS INDEX: 32.27 KG/M2 | WEIGHT: 188 LBS | SYSTOLIC BLOOD PRESSURE: 120 MMHG | DIASTOLIC BLOOD PRESSURE: 82 MMHG

## 2025-05-07 DIAGNOSIS — Z01.419 WELL WOMAN EXAM: Primary | ICD-10-CM

## 2025-05-07 DIAGNOSIS — Z12.31 ENCOUNTER FOR SCREENING MAMMOGRAM FOR MALIGNANT NEOPLASM OF BREAST: ICD-10-CM

## 2025-05-07 DIAGNOSIS — Z30.41 ENCOUNTER FOR SURVEILLANCE OF CONTRACEPTIVE PILLS: ICD-10-CM

## 2025-05-07 PROCEDURE — S0612 ANNUAL GYNECOLOGICAL EXAMINA: HCPCS | Performed by: PHYSICIAN ASSISTANT

## 2025-05-07 RX ORDER — ACETAMINOPHEN AND CODEINE PHOSPHATE 120; 12 MG/5ML; MG/5ML
1 SOLUTION ORAL DAILY
Qty: 84 TABLET | Refills: 4 | Status: SHIPPED | OUTPATIENT
Start: 2025-05-07

## 2025-05-14 ENCOUNTER — RESULTS FOLLOW-UP (OUTPATIENT)
Dept: CARDIOLOGY CLINIC | Facility: CLINIC | Age: 40
End: 2025-05-14

## 2025-05-14 ENCOUNTER — HOSPITAL ENCOUNTER (OUTPATIENT)
Dept: NON INVASIVE DIAGNOSTICS | Facility: CLINIC | Age: 40
Discharge: HOME | End: 2025-05-14
Payer: COMMERCIAL

## 2025-05-14 VITALS
HEART RATE: 72 BPM | BODY MASS INDEX: 32.11 KG/M2 | SYSTOLIC BLOOD PRESSURE: 120 MMHG | WEIGHT: 188.05 LBS | DIASTOLIC BLOOD PRESSURE: 82 MMHG | HEIGHT: 64 IN

## 2025-05-14 DIAGNOSIS — I77.810 ACQUIRED DILATION OF ASCENDING AORTA AND AORTIC ROOT (HCC): ICD-10-CM

## 2025-05-14 DIAGNOSIS — Q23.81 BICUSPID AORTIC VALVE: ICD-10-CM

## 2025-05-14 LAB
AORTIC ROOT: 3 CM
AORTIC VALVE MEAN VELOCITY: 20.1 M/S
ASCENDING AORTA: 4.3 CM
AV AREA BY CONTINUOUS VTI: 1.4 CM2
AV AREA PEAK VELOCITY: 1.4 CM2
AV LVOT MEAN GRADIENT: 3 MMHG
AV LVOT PEAK GRADIENT: 4 MMHG
AV MEAN PRESS GRAD SYS DOP V1V2: 18 MMHG
AV ORIFICE AREA US: 1.37 CM2
AV PEAK GRADIENT: 31 MMHG
AV VELOCITY RATIO: 0.36
AV VMAX SYS DOP: 2.77 M/S
BSA FOR ECHO PROCEDURE: 1.91 M2
DOP CALC AO VTI: 65.88 CM
DOP CALC LVOT AREA: 3.8 CM2
DOP CALC LVOT CARDIAC INDEX: 3.01 L/MIN/M2
DOP CALC LVOT CARDIAC OUTPUT: 5.75 L/MIN
DOP CALC LVOT DIAMETER: 2.2 CM
DOP CALC LVOT PEAK VEL VTI: 23.78 CM
DOP CALC LVOT PEAK VEL: 1.01 M/S
DOP CALC LVOT STROKE INDEX: 47.6 ML/M2
DOP CALC LVOT STROKE VOLUME: 90.35
E WAVE DECELERATION TIME: 155 MS
E/A RATIO: 1.35
FRACTIONAL SHORTENING: 38 (ref 28–44)
INTERVENTRICULAR SEPTUM IN DIASTOLE (PARASTERNAL SHORT AXIS VIEW): 1 CM
INTERVENTRICULAR SEPTUM: 1 CM (ref 0.6–1.1)
LAAS-AP2: 20 CM2
LAAS-AP4: 20.4 CM2
LEFT ATRIUM SIZE: 3.6 CM
LEFT ATRIUM VOLUME (MOD BIPLANE): 58 ML
LEFT ATRIUM VOLUME INDEX (MOD BIPLANE): 30.4 ML/M2
LEFT INTERNAL DIMENSION IN SYSTOLE: 3 CM (ref 2.1–4)
LEFT VENTRICULAR INTERNAL DIMENSION IN DIASTOLE: 4.8 CM (ref 3.5–6)
LEFT VENTRICULAR POSTERIOR WALL IN END DIASTOLE: 1 CM
LEFT VENTRICULAR STROKE VOLUME: 74 ML
LV EF US.2D.A4C+ESTIMATED: 64 %
LVSV (TEICH): 74 ML
MV E'TISSUE VEL-LAT: 13 CM/S
MV E'TISSUE VEL-SEP: 10 CM/S
MV PEAK A VEL: 0.63 M/S
MV PEAK E VEL: 85 CM/S
MV STENOSIS PRESSURE HALF TIME: 45 MS
MV VALVE AREA P 1/2 METHOD: 4.89
RA PRESSURE ESTIMATED: 5 MMHG
RIGHT ATRIUM AREA SYSTOLE A4C: 14.1 CM2
RIGHT VENTRICLE ID DIMENSION: 2.9 CM
RV PSP: 22 MMHG
SINOTUBULAR JUNCTION: 2.8 CM
SL CV LEFT ATRIUM LENGTH A2C: 5.6 CM
SL CV LV EF: 65
SL CV PED ECHO LEFT VENTRICLE DIASTOLIC VOLUME (MOD BIPLANE) 2D: 108 ML
SL CV PED ECHO LEFT VENTRICLE SYSTOLIC VOLUME (MOD BIPLANE) 2D: 34 ML
SL CV SINUS OF VALSALVA 2D: 2.7 CM
STJ: 2.8 CM
TR MAX PG: 17 MMHG
TR PEAK VELOCITY: 2.1 M/S
TRICUSPID ANNULAR PLANE SYSTOLIC EXCURSION: 2.3 CM
TRICUSPID VALVE PEAK REGURGITATION VELOCITY: 2.07 M/S

## 2025-05-14 PROCEDURE — 93306 TTE W/DOPPLER COMPLETE: CPT | Performed by: INTERNAL MEDICINE

## 2025-05-14 PROCEDURE — 93306 TTE W/DOPPLER COMPLETE: CPT

## 2025-05-28 DIAGNOSIS — G47.09 OTHER INSOMNIA: ICD-10-CM

## 2025-05-29 RX ORDER — ZOLPIDEM TARTRATE 10 MG/1
10 TABLET ORAL
Qty: 30 TABLET | Refills: 2 | Status: SHIPPED | OUTPATIENT
Start: 2025-05-29

## 2025-05-29 NOTE — TELEPHONE ENCOUNTER
Medication:  PDMP   04/26/2025 04/22/2025 04/22/2025 Zolpidem Tartrate (Tablet) 30.0 30 10 MG NA JACOB GARSIA     Active agreement on file -No

## 2025-06-16 DIAGNOSIS — F41.9 ANXIETY: ICD-10-CM

## 2025-06-16 RX ORDER — ESCITALOPRAM OXALATE 10 MG/1
10 TABLET ORAL DAILY
Qty: 90 TABLET | Refills: 1 | Status: SHIPPED | OUTPATIENT
Start: 2025-06-16

## 2025-07-21 ENCOUNTER — OCCMED (OUTPATIENT)
Dept: URGENT CARE | Age: 40
End: 2025-07-21
Payer: OTHER MISCELLANEOUS

## 2025-07-21 DIAGNOSIS — S39.92XA BACK INJURY, INITIAL ENCOUNTER: Primary | ICD-10-CM

## 2025-07-21 PROCEDURE — 99283 EMERGENCY DEPT VISIT LOW MDM: CPT

## 2025-07-21 PROCEDURE — 96372 THER/PROPH/DIAG INJ SC/IM: CPT

## 2025-07-21 PROCEDURE — G0382 LEV 3 HOSP TYPE B ED VISIT: HCPCS

## 2025-07-25 ENCOUNTER — APPOINTMENT (OUTPATIENT)
Dept: URGENT CARE | Age: 40
End: 2025-07-25
Payer: OTHER MISCELLANEOUS

## 2025-07-25 PROCEDURE — 99214 OFFICE O/P EST MOD 30 MIN: CPT
